# Patient Record
Sex: MALE | Race: ASIAN | NOT HISPANIC OR LATINO | Employment: UNEMPLOYED | URBAN - METROPOLITAN AREA
[De-identification: names, ages, dates, MRNs, and addresses within clinical notes are randomized per-mention and may not be internally consistent; named-entity substitution may affect disease eponyms.]

---

## 2024-02-12 ENCOUNTER — APPOINTMENT (OUTPATIENT)
Dept: GENERAL RADIOLOGY | Facility: CLINIC | Age: 24
End: 2024-02-12
Attending: EMERGENCY MEDICINE
Payer: COMMERCIAL

## 2024-02-12 ENCOUNTER — HOSPITAL ENCOUNTER (EMERGENCY)
Facility: CLINIC | Age: 24
Discharge: HOME | End: 2024-02-12
Attending: EMERGENCY MEDICINE | Admitting: EMERGENCY MEDICINE
Payer: COMMERCIAL

## 2024-02-12 VITALS
SYSTOLIC BLOOD PRESSURE: 130 MMHG | TEMPERATURE: 98.4 F | BODY MASS INDEX: 20.89 KG/M2 | OXYGEN SATURATION: 100 % | DIASTOLIC BLOOD PRESSURE: 76 MMHG | RESPIRATION RATE: 20 BRPM | HEIGHT: 66 IN | HEART RATE: 82 BPM | WEIGHT: 130 LBS

## 2024-02-12 DIAGNOSIS — S66.921A HAND LACERATION INVOLVING TENDON, RIGHT, INITIAL ENCOUNTER: ICD-10-CM

## 2024-02-12 DIAGNOSIS — S61.411A HAND LACERATION INVOLVING TENDON, RIGHT, INITIAL ENCOUNTER: ICD-10-CM

## 2024-02-12 DIAGNOSIS — S62.300B: ICD-10-CM

## 2024-02-12 PROCEDURE — 26600 TREAT METACARPAL FRACTURE: CPT | Mod: 59

## 2024-02-12 PROCEDURE — 96365 THER/PROPH/DIAG IV INF INIT: CPT | Mod: 59

## 2024-02-12 PROCEDURE — 90715 TDAP VACCINE 7 YRS/> IM: CPT | Performed by: EMERGENCY MEDICINE

## 2024-02-12 PROCEDURE — 12002 RPR S/N/AX/GEN/TRNK2.6-7.5CM: CPT

## 2024-02-12 PROCEDURE — 250N000011 HC RX IP 250 OP 636: Performed by: EMERGENCY MEDICINE

## 2024-02-12 PROCEDURE — 99284 EMERGENCY DEPT VISIT MOD MDM: CPT | Mod: 25

## 2024-02-12 PROCEDURE — 73130 X-RAY EXAM OF HAND: CPT | Mod: RT

## 2024-02-12 PROCEDURE — 90471 IMMUNIZATION ADMIN: CPT | Performed by: EMERGENCY MEDICINE

## 2024-02-12 RX ORDER — LIDOCAINE HYDROCHLORIDE AND EPINEPHRINE 10; 10 MG/ML; UG/ML
INJECTION, SOLUTION INFILTRATION; PERINEURAL
Status: DISCONTINUED
Start: 2024-02-12 | End: 2024-02-12 | Stop reason: HOSPADM

## 2024-02-12 RX ORDER — CEPHALEXIN 500 MG/1
500 CAPSULE ORAL 4 TIMES DAILY
Qty: 28 CAPSULE | Refills: 0 | Status: SHIPPED | OUTPATIENT
Start: 2024-02-12 | End: 2024-02-19

## 2024-02-12 RX ORDER — LIDOCAINE HYDROCHLORIDE AND EPINEPHRINE 10; 10 MG/ML; UG/ML
10 INJECTION, SOLUTION INFILTRATION; PERINEURAL ONCE
Status: DISCONTINUED | OUTPATIENT
Start: 2024-02-12 | End: 2024-02-12 | Stop reason: HOSPADM

## 2024-02-12 RX ORDER — CEFAZOLIN SODIUM 1 G/3ML
1 INJECTION, POWDER, FOR SOLUTION INTRAMUSCULAR; INTRAVENOUS ONCE
Status: COMPLETED | OUTPATIENT
Start: 2024-02-12 | End: 2024-02-12

## 2024-02-12 RX ADMIN — CEFAZOLIN 1 G: 1 INJECTION, POWDER, FOR SOLUTION INTRAMUSCULAR; INTRAVENOUS at 14:08

## 2024-02-12 RX ADMIN — CLOSTRIDIUM TETANI TOXOID ANTIGEN (FORMALDEHYDE INACTIVATED), CORYNEBACTERIUM DIPHTHERIAE TOXOID ANTIGEN (FORMALDEHYDE INACTIVATED), BORDETELLA PERTUSSIS TOXOID ANTIGEN (GLUTARALDEHYDE INACTIVATED), BORDETELLA PERTUSSIS FILAMENTOUS HEMAGGLUTININ ANTIGEN (FORMALDEHYDE INACTIVATED), BORDETELLA PERTUSSIS PERTACTIN ANTIGEN, AND BORDETELLA PERTUSSIS FIMBRIAE 2/3 ANTIGEN 0.5 ML: 5; 2; 2.5; 5; 3; 5 INJECTION, SUSPENSION INTRAMUSCULAR at 11:47

## 2024-02-12 ASSESSMENT — ACTIVITIES OF DAILY LIVING (ADL)
ADLS_ACUITY_SCORE: 33
ADLS_ACUITY_SCORE: 35

## 2024-02-12 NOTE — ED NOTES
Writer called to inform [facility] personnel at home care that pt. Is for discharged. [facility] will come and pick him up. Pt. Discharged. AVS given.

## 2024-02-12 NOTE — ED TRIAGE NOTES
Pt was shadowboxing, got too close to the mirror and punched the mirror causing it to break. Pt called 911, hand was wrapped by EMS, did not unwrap in triage.     Pt has a bag with a piece of bone in it. Pt also reports he is unable to outstretch fingers.      Triage Assessment (Adult)       Row Name 02/12/24 1047          Triage Assessment    Airway WDL WDL        Respiratory WDL    Respiratory WDL WDL        Skin Circulation/Temperature WDL    Skin Circulation/Temperature WDL --  per pt laceration on right hand, hand wrapped by EMS        Cardiac WDL    Cardiac WDL WDL        Peripheral/Neurovascular WDL    Peripheral Neurovascular WDL WDL        Cognitive/Neuro/Behavioral WDL    Cognitive/Neuro/Behavioral WDL WDL

## 2024-02-12 NOTE — ED PROVIDER NOTES
"    History     Chief Complaint:  Laceration       HPI   Jw Broderick is a 24 year old male who has a history of chronic schizophrenia and presents with a laceration. The patient was shadowboxing in his bathroom, when he punched upwards against an already broken mirror. He injured his right hand, lacerating his index finger. Patient lives at a group home. He is unable to extend the finger. He brings a small bony fragment from the injury as well. This occurred at around 1020. He is not up to date with Tetanus.    Independent Historian:    None    Review of External Notes:  None    Medications:    Olanzapine    Past Medical History:    Chronic schizophrenia    Physical Exam   Patient Vitals for the past 24 hrs:   BP Temp Temp src Pulse Resp SpO2 Height Weight   02/12/24 1051 130/76 98.4  F (36.9  C) Temporal 82 20 100 % 1.676 m (5' 6\") 59 kg (130 lb)      Physical Exam  Vitals and nursing note reviewed.   Constitutional:       General: He is not in acute distress.     Appearance: He is not ill-appearing.   HENT:      Head: Normocephalic and atraumatic.      Right Ear: External ear normal.      Left Ear: External ear normal.      Nose: Nose normal.      Mouth/Throat:      Mouth: Mucous membranes are moist.   Eyes:      Extraocular Movements: Extraocular movements intact.      Conjunctiva/sclera: Conjunctivae normal.   Pulmonary:      Effort: Pulmonary effort is normal. No respiratory distress.   Musculoskeletal:         General: No deformity or signs of injury.      Right hand: Laceration and tenderness present. Decreased range of motion. Decreased strength. Normal sensation. There is no disruption of two-point discrimination. Normal capillary refill. Normal pulse.        Hands:       Cervical back: Normal range of motion and neck supple.   Skin:     General: Skin is warm and dry.      Findings: No rash.   Neurological:      Mental Status: He is alert and oriented to person, place, and time.   Psychiatric:    "      Behavior: Behavior normal.           Emergency Department Course     Imaging:  XR Hand Right G/E 3 Views   Final Result   IMPRESSION:      Examination is limited due to patient's inability to extend fingers to   position correctly for the exam.  The lateral view in particular is   limited by overlap of osseous structures due to patient's inability to   position.      There appears to be a small osseous fragment projecting over the index   finger metacarpal head and first MTP joint, only seen on the AP view   but suspicious for an acute fracture. Consider CT for better   evaluation.      NOTE: ABNORMAL REPORT      THE DICTATION ABOVE DESCRIBES AN ABNORMAL REPORT FOR WHICH FOLLOW-UP   IS NEEDED.      MARCE OLIVAS MD            SYSTEM ID:  [id number]        Report per radiology    Procedures       Laceration Repair      Procedure: Laceration Repair    Indication: Laceration    Consent: Verbal    Location: Right R third (middle) finger and R second (index) finger    Length: 4.5 cm in the index finger, 1.5 cm in the middle finger.    Preparation: Irrigation with Tap Water and Pressure device utilized.    Anesthesia/Sedation: Lidocaine with Epinephrine - 1%      Treatment/Exploration: Wound explored, no foreign bodies found     Closure: The wound was closed with one layer. Skin/superficial layer was closed with 7 x 5-0 Nylon using Interrupted sutures.  And the middle finger wound was closed with one layer. Skin/superficial layer was closed with 2 x 5-0 Nylon using Interrupted sutures.     Patient Status: The patient tolerated the procedure well: Yes. There were no complications.    Narrative:  Splint Placement   Splint was applied and after placement I checked and adjusted the fit to ensure proper positioning. Patient was more comfortable with splint in place. Sensation and circulation are intact after splint placement.    Emergency Department Course & Assessments:    Interventions:  Medications   lidocaine 1%  with EPINEPHrine 1:100,000 injection 10 mL (has no administration in time range)   lidocaine 1% with EPINEPHrine 1:100,000 1 %-1:[id number] injection (has no administration in time range)   Tdap (tetanus-diphtheria-acell pertussis) (ADACEL) injection 0.5 mL (0.5 mLs Intramuscular $Given 24 1147)   ceFAZolin (ANCEF) 1 g vial to attach to  ml bag for ADULT or 50 ml bag for PEDS (0 g Intravenous Stopped 24 1438)      Assessments:  1120 I obtained history and examined patient.   1320 I rechecked the patient and explained findings. Laceration procedure performed.  1352 Laceration procedure completed.    Independent Interpretation (X-rays, CTs, rhythm strip):  I reviewed the right hand x-ray and do not see any definite fractures per my read    Consultations/Discussion of Management or Tests:  1325 I spoke with REY Castillo of the [facility] Hand Orthoped[facility] service to discuss the patient's findings, presentation, and plan of care.       Social Determinants of Health affecting care:  None     Disposition:  The patient was discharged to home.   Impression & Plan    Medical Decision Makin-year-old male presenting with a right hand injury that occurred earlier today.  He lacerated the dorsal right hand on a broken mirror.  There do not appear to be any foreign bodies in the wound.  I am worried that he may have a extensor tendon injury and joint capsule injury based on the location of the injury and his inability to extend the finger without severe pain.  He also had a bony chip that he took out of the wound which I am worried could be from the head of the metacarpal bone.  X-ray suggests a possible fracture of the metacarpal head as well according to radiology.  I discussed this further with orthopedics and they recommend loose approximation after thorough cleaning, splinting, antibiotics, and hand surgery follow-up with Dr. Rosenthal..  The wound was anesthetized with lidocaine and epinephrine as noted above.  The wound  was thoroughly irrigated and loosely repaired.  He was then placed in a volar Ortho-Glass splint.  He was given a dose of Ancef and his Tdap was updated.  He will be sent home on Keflex and he will follow-up with hand surgery.  We discussed return precautions.    Diagnosis:    ICD-10-CM    1. Hand laceration involving tendon, right, initial encounter  S61.411A     S66.921A       2. Open nondisplaced fracture of second metacarpal bone of right hand, unspecified portion of metacarpal, initial encounter  S62.300B            Discharge Medications:  New Prescriptions    CEPHALEXIN (KEFLEX) 500 MG CAPSULE    Take 1 capsule (500 mg) by mouth 4 times daily for 7 days      Scribe Disclosure:  Brandon SANDERS, am serving as a scribe at 11:12 AM on 2/12/2024 to document services personally performed by Cal Trujillo MD based on my observations and the provider's statements to me.  2/12/2024   Cal Trujillo MD Goodwin, Shaun M, MD  02/12/24 7977

## 2024-03-11 ENCOUNTER — HOSPITAL ENCOUNTER (EMERGENCY)
Facility: CLINIC | Age: 24
Discharge: HOME | End: 2024-03-11
Attending: PSYCHIATRY & NEUROLOGY | Admitting: PSYCHIATRY & NEUROLOGY
Payer: COMMERCIAL

## 2024-03-11 VITALS
HEIGHT: 66 IN | TEMPERATURE: 97.8 F | DIASTOLIC BLOOD PRESSURE: 79 MMHG | BODY MASS INDEX: 21.69 KG/M2 | SYSTOLIC BLOOD PRESSURE: 137 MMHG | RESPIRATION RATE: 16 BRPM | WEIGHT: 135 LBS | HEART RATE: 72 BPM | OXYGEN SATURATION: 98 %

## 2024-03-11 DIAGNOSIS — F31.11 BIPOLAR 1 DISORDER, MANIC, MILD (H): ICD-10-CM

## 2024-03-11 PROBLEM — F39 UNSPECIFIED MOOD (AFFECTIVE) DISORDER (H): Status: ACTIVE | Noted: 2024-03-11

## 2024-03-11 PROCEDURE — 99284 EMERGENCY DEPT VISIT MOD MDM: CPT | Performed by: PSYCHIATRY & NEUROLOGY

## 2024-03-11 PROCEDURE — 99283 EMERGENCY DEPT VISIT LOW MDM: CPT | Performed by: PSYCHIATRY & NEUROLOGY

## 2024-03-11 PROCEDURE — 250N000013 HC RX MED GY IP 250 OP 250 PS 637: Performed by: PSYCHIATRY & NEUROLOGY

## 2024-03-11 RX ORDER — OLANZAPINE 15 MG/1
15 TABLET ORAL AT BEDTIME
Status: ON HOLD | COMMUNITY
End: 2024-08-09

## 2024-03-11 RX ORDER — OLANZAPINE 5 MG/1
5 TABLET ORAL EVERY MORNING
Qty: 30 TABLET | Refills: 0 | Status: ON HOLD | OUTPATIENT
Start: 2024-03-11 | End: 2024-08-09

## 2024-03-11 RX ADMIN — OLANZAPINE 15 MG: 10 TABLET, ORALLY DISINTEGRATING ORAL at 22:36

## 2024-03-11 ASSESSMENT — ACTIVITIES OF DAILY LIVING (ADL)
ADLS_ACUITY_SCORE: 35
ADLS_ACUITY_SCORE: 35
ADLS_ACUITY_SCORE: 33
ADLS_ACUITY_SCORE: 33

## 2024-03-11 ASSESSMENT — COLUMBIA-SUICIDE SEVERITY RATING SCALE - C-SSRS
6. HAVE YOU EVER DONE ANYTHING, STARTED TO DO ANYTHING, OR PREPARED TO DO ANYTHING TO END YOUR LIFE?: NO
2. HAVE YOU ACTUALLY HAD ANY THOUGHTS OF KILLING YOURSELF IN THE PAST MONTH?: NO
1. IN THE PAST MONTH, HAVE YOU WISHED YOU WERE DEAD OR WISHED YOU COULD GO TO SLEEP AND NOT WAKE UP?: NO

## 2024-03-11 NOTE — ED TRIAGE NOTES
Patient reports he is was doing back flips at the  that is why he damaged the walls in his room. Patient reports he accidentally toss his phone that is why it broke the window.      Triage Assessment (Adult)       Row Name 03/11/24 1832          Triage Assessment    Airway WDL WDL        Respiratory WDL    Respiratory WDL WDL        Skin Circulation/Temperature WDL    Skin Circulation/Temperature WDL WDL        Cardiac WDL    Cardiac WDL WDL        Peripheral/Neurovascular WDL    Peripheral Neurovascular WDL WDL

## 2024-03-11 NOTE — ED PROVIDER NOTES
"    [facility] EMERGENCY DEPARTMENT ([facility])    3/11/24      ED PROVIDER NOTE   History     Chief Complaint   Patient presents with    Mental Health Problem     Patient was drop off by  staff, was told he needs a mental health check since patient has damaged the wall and broken a window at the      HPI  Jw Broderick is a 24 year old male with a past medical history of bipolar illness who presents to the ED for evaluation of mental health problem. Patient resides in a group home. He has been hyper and elevated in his mood and had damaged a wall and broken a window today while trying to do backflips in his room. Staff was concerned and had him come in for an assessment. Patient appears mildly elevated but is easily redirectable. He denies any thoughts of harm to self or others. He agrees that doing backflips in his room is not a good idea.     Group home is comfortable with his return. They plan on having him see his established psychiatrist sooner than his appointment at the end of the month.    Past Medical History  History reviewed. No pertinent past medical history.  No past surgical history on file.  OLANZapine (ZYPREXA) 15 MG tablet  OLANZapine (ZYPREXA) 5 MG tablet      No Known Allergies  Family History  No family history on file.  Social History   Social History     Tobacco Use    Smoking status: Never    Smokeless tobacco: Never   Substance Use Topics    Alcohol use: Not Currently    Drug use: Not Currently         A medically appropriate review of systems was performed with pertinent positives and negatives noted in the HPI, and all other systems negative.    Physical Exam   BP: 119/74  Pulse: 80  Temp: 98  F (36.7  C)  Resp: 16  Height: 167.6 cm (5' 6\")  Weight: 61.2 kg (135 lb)  SpO2: 97 %  Physical Exam  Vitals and nursing note reviewed.   HENT:      Head: Normocephalic.   Eyes:      Pupils: Pupils are equal, round, and reactive to light.   Pulmonary:      Effort: Pulmonary effort " "is normal.   Musculoskeletal:         General: Normal range of motion.      Cervical back: Normal range of motion.   Neurological:      General: No focal deficit present.      Mental Status: He is alert.   Psychiatric:         Attention and Perception: Attention and perception normal. He does not perceive auditory or visual hallucinations.         Mood and Affect: Affect normal. Mood is elated.         Speech: Speech is rapid and pressured.         Behavior: Behavior normal. Behavior is not agitated, aggressive, hyperactive or combative. Behavior is cooperative.         Thought Content: Thought content normal. Thought content is not paranoid or delusional. Thought content does not include homicidal or suicidal ideation.         Cognition and Memory: Cognition and memory normal.         Judgment: Judgment normal.           ED Course, Procedures, & Data      Procedures       A consult was attained from the  DEC service. The case was discussed with the  from that service. The consulting service's recommendations were provided at 10:30 PM. 10 minutes spent reviewing prior records and interventions. 10 minutes spent discussing case, care and disposition.     Patient has history of being seen previously as he had lacerated his hand while \"shadowboxing\" and he had injured his hand on broken glass. He denies any damage to his body presently.     No results found for any visits on 03/11/24.  Medications   OLANZapine zydis (zyPREXA) ODT tab 15 mg (15 mg Oral $Given 3/11/24 9117)     Labs Ordered and Resulted from Time of ED Arrival to Time of ED Departure - No data to display  No orders to display          Critical care was not performed.     Medical Decision Making  The patient's presentation was of moderate complexity (a chronic illness mild to moderate exacerbation, progression, or side effect of treatment).    The patient's evaluation involved:  an assessment requiring an independent historian (see separate area " of note for details)  review of external note(s) from 2 sources (see separate area of note for details)  review of 2 test result(s) ordered prior to this encounter (see separate area of note for details)    The patient's management necessitated moderate risk (prescription drug management including medications given in the ED) and moderate risk (limitations due to social determinants of health (inadequate community  support)).    Assessment & Plan    Patient is here as he appears mildly decompensated and presently with mild yojana. He is redirectable. There is no acute safety concern requiring holding him here against his will nor admitting him. He agrees to taking olanzapine 5 mg in the morning in addition to his HS olanzapine to stabilize his yojana. Patient can be discharged. He is encouraged to follow-up with his established care services and providers for ongoing care and support.    I have reviewed the nursing notes. I have reviewed the findings, diagnosis, plan and need for follow up with the patient.    Discharge Medication List as of 3/11/2024 10:32 PM        START taking these medications    Details   !! OLANZapine (ZYPREXA) 5 MG tablet Take 1 tablet (5 mg) by mouth every morning, Disp-30 tablet, R-0, Local Print       !! - Potential duplicate medications found. Please discuss with provider.          Final diagnoses:   Bipolar 1 disorder, manic, mild (H)       M [facility] EMERGENCY DEPARTMENT  3/11/2024     Rupert Guajardo MD  03/11/24 0118

## 2024-03-12 NOTE — ED NOTES
Writer spoke to [facility] patient's . Staff is aware patient will be returning tonight and per  patient is safe to discharge via cab.

## 2024-03-12 NOTE — CONSULTS
Diagnostic Evaluation Consultation  Crisis Assessment    Patient Name: Jw Broderick  Age:  24 year old  Legal Sex: male  Gender Identity: male  Pronouns:   Race:   Ethnicity: Not  or   Language: English      Patient was assessed: Virtual: iPad Crisis Assessment Start Time: 2116 Crisis Assessment Stop Time: 2155  Patient location: [facility] EMERGENCY DEPARTMENT                             URE-H    Referral Data and Chief Complaint  Jw Broderick presents to the ED via EMS. Patient is presenting to the ED for the following concerns: Physical aggression.   Factors that make the mental health crisis life threatening or complex are:  Patient reports he is was doing back flips at the  that is why he damaged the walls in his room. Patient reports he accidentally toss his phone that is why it broke the window..      Informed Consent and Assessment Methods  Explained the crisis assessment process, including applicable information disclosures and limits to confidentiality, assessed understanding of the process, and obtained consent to proceed with the assessment.  Assessment methods included conducting a formal interview with patient, review of medical records, collaboration with medical staff, and obtaining relevant collateral information from family and community providers when available.  : done     Patient response to interventions: eager to participate, acceptance expressed  Coping skills were attempted to reduce the crisis:  going to the gym     History of the Crisis   He reports tonight he was doing flips in his room (lives at group home and reports this is due to mental health, though denies he has any specific mental health issues) and that he often tries to stay active and be athletic.  This led to him falling into the walls, making a hole in the wall. He also tossed his phone, intending for it land on the window ledge and instead it hit the window and broke the  window.  Also notes this morning opening a door that he feels was  really light, lighter than I thought  and the hinges came off when he opened it.  Reports the group home staff  didn t understand  and that this is the third or forth time he has caused this kind of damage.  He reports  they wanted a check-up  and sent him to the ED. He notes that he isn t sure they believe his explanation and he wonders if they think he was being aggressive.  Reports his mental health is doing  really good . Reports he was in long-term and released the next day and  forgot to call in  which led to him being admitted to a mental hospital (this occurred 4 years ago), after which he went to a group home. Reports he does not feel he has schizophrenia though is aware he has been diagnosed with that in the past. Does take Olanzapine and notes that is does help him clear his thoughts and keep him calm.    Enjoys doing  everything and anything athletic . Used to play on sports team, now goes to the gym (started two weeks ago). Has been looking for a new job. Car recently broke down and he plans to fix this soon. Group home staff bring him to doctor's appointments. Gets along well with group home staff. Denies any aggressive behaviors toward himself or others. Denies suicidal ideations. Has been experiencing more goal directed activities (cleaning room, wanting to work out, renewed focus on job hunting). Substance use wise, uses nicotine (vapes lasts 1 week), caffeine (drinks 2 cans of pop a day and 1-2 cups of tea, 1-2 energy drinks a day; this is a reduction from his usual baseline of 1-3 cans of pop per day,  jug  of coffee a day, 1-2 energy drinks a day). Notes that he started cutting back last year after staying up 4-5 days straight and feeling this was due to his caffeine intake at the time. Denies any other substance use now or in the past. Go to bed sometime between 8pm and midnight, takes about 20-30 minutes to fall asleep, sleeps  through the night, and wakes between 5-8am. Reports he is getting about 8 hours per night. Medications prescribed by Dr. Sorto with [facility]. January was last appointment; next appointment March 28th.Denies hallucination, delusions, or paranoia. Reports no mood or anxiety concerns. Would like to discharge back to the group home and agrees it may be good to check in with his psychiatrist about the increased energy and see if a medication change may assist, although patient feels his energy levels are normal and struggles with insight into how his behaviors may be somewhat different than would be expected.    Brief Psychosocial History  Family:  Single, Children no  Support System:  Facility resident(s)/Staff  Employment Status:  unemployed  Source of Income:  disability  Financial Environmental Concerns:  none  Current Hobbies:  exercise/fitness, television/movies/videos, social media/computer activities  Barriers in Personal Life:  mental health concerns    Significant Clinical History  Current Anxiety Symptoms:     Current Depression/Trauma:     Current Somatic Symptoms:     Current Psychosis/Thought Disturbance:  impulsive, hyperverbal  Current Eating Symptoms:     Chemical Use History:      Past diagnosis:  Schizophrenia  Family history:  No known history of mental health or chemical health concerns  Past treatment:  Inpatient Hospitalization, Primary Care, Psychiatric Medication Management  Details of most recent treatment:  Sees psychiatrist Q3 months  Other relevant history:          Collateral Information (gathered by Nicci Taylor)  Is there collateral information: Yes     Collateral information name, relationship, phone number:  Remberto Smith () [phone] and group home nurse, Shon    What happened today: The pt had been a resident of this group home for 2 years, then in May 2023 the pt returned to his families home and was staying there. After the pt had lived at home and saved  "money he left his family home and was homeless for approximately one month before he ended up moving back into the group home. The pt moved back to the  on September 9th, 2023. Since the pt moved back in Sept. he has been more hyper, agitated, and increasingly destructive. Pt has caused damage to the property a number of times, including today when he caused holes in the wall (they believe from punching the wall) and also broke a window. The  has 1 staff for 3 residents, the pt is allowed to come and go. Today the pt left at about 9 am and was home by 11 am, he left again for a couple of hours in the afternoon. Staff state the pt is medication compliant, they are not aware of drug or alcohol use, but the pt is able to freely leave the facility so they are not certain. The pt is not currently working, he has not made any suicidal comments or reported SI to staff, pt is his own guardian. The pt told his nurse, Shon today that he \"doesn't know what is wrong with me.\"     What is different about patient's functioning: Pt has been distinctly more hyper, agitated, and destructive with property since his return in Sept 2023. The group home states that the pt can return there, although they are very concerned with the drastic behavioral changes the pt has been displaying.        Risk Assessment  Matheson Suicide Severity Rating Scale Full Clinical Version:  Suicidal Ideation  Q6 Suicide Behavior (Lifetime): no          Matheson Suicide Severity Rating Scale Recent:   Suicidal Ideation (Recent)  Q1 Wished to be Dead (Past Month): no  Q2 Suicidal Thoughts (Past Month): no  Level of Risk per Screen: no risks indicated     Suicidal Behavior (Recent)  Actual Attempt (Past 3 Months): No  Has subject engaged in non-suicidal self-injurious behavior? (Past 3 Months): No  Interrupted Attempts (Past 3 Months): No  Aborted or Self-Interrupted Attempt (Past 3 Months): No  Preparatory Acts or Behavior (Past 3 Months): " No    Environmental or Psychosocial Events: impulsivity/recklessness, neither working nor attending school  Protective Factors: Protective Factors: lives in a responsibly safe and stable environment, good treatment engagement, sense of importance of health and wellness, able to access care without barriers, supportive ongoing medical and mental health care relationships, sense of self-efficacy and/or positive self-esteem, optimistic outlook - identification of future goals    Does the patient have thoughts of harming others? Feels Like Hurting Others: no  Previous Attempt to Hurt Others: no  Is the patient engaging in sexually inappropriate behavior?: no    Is the patient engaging in sexually inappropriate behavior?  no        Mental Status Exam   Affect: Appropriate  Appearance: Appropriate  Attention Span/Concentration: Attentive  Eye Contact: Intense    Fund of Knowledge: Appropriate   Language /Speech Content: Fluent, Expressive Speech  Language /Speech Volume: Normal  Language /Speech Rate/Productions: Hyperverbal  Recent Memory: Intact  Remote Memory: Intact  Mood: Normal  Orientation to Person: Yes   Orientation to Place: Yes  Orientation to Time of Day: Yes  Orientation to Date: Yes     Situation (Do they understand why they are here?): Yes  Psychomotor Behavior: Normal  Thought Content: Clear  Thought Form: Intact, Loose Associations        Medication  Psychotropic medications:   Medication Orders - Psychiatric (From admission, onward)      Start     Dose/Rate Route Frequency Ordered Stop    03/11/24 2230  OLANZapine zydis (zyPREXA) ODT tab 15 mg        Note to Pharmacy: DO NOT USE THIS FIELD FOR ADMIN INSTRUCTIONS; INFORMATION DOES NOT SHOW ON MAR. USE THE FIELD ABOVE MARKED ADMIN INSTRUCTIONS    15 mg Oral ONCE 03/11/24 2225      03/11/24 0000  OLANZapine (ZYPREXA) 5 MG tablet         5 mg Oral EVERY MORNING 03/11/24 2218               Current Care Team  Patient Care Team:  No Ref-Primary, Physician as  PCP - General  Dr. Sorto as PCP    Diagnosis  Patient Active Problem List   Diagnosis Code    Unspecified mood (affective) disorder (H24) F39       Primary Problem This Admission  Active Hospital Problems    *Unspecified mood (affective) disorder (H24)        Clinical Summary and Substantiation of Recommendations   Patient with a history of schiophrenia appears to have been experiencing an increase in engery, goal directed activity, and impulsive/reckless behaviors for the past few months leading to unplanned property damage and concerns from group home staff that he is unreasonably hyper and appears agitated. Patient has limited insight into these concerns, though does note that moving forward he will plan to engage in physically strenous activities at the gym or outdoors. He is open to meeting with his established psychiatrist to discuss the increased energy. Does not meet criteria for a manic episode at this time, though certainly worth a follow up with his psychiatrist to discuss. Pt lives in a safe and responsible setting, denies any thoughts of harming self or others, appears to able to care for self with the support of his group home staff. Safe at this time to discharge to outpatient setting. Already has scheduled follow up with established psychiatrist (3/28/24)  and after conversation with group home staff, they will plan to contact patients psychiatrist to see if he can be seen sooner.       Patient coping skills attempted to reduce the crisis:  going to the gym    Disposition  Recommended disposition: Medication Management        Reviewed case and recommendations with attending provider. Attending Name: Dr. Prakash       Attending concurs with disposition: yes       Patient and/or validated legal guardian concurs with disposition:   yes       Final disposition:  discharge    Assessment Details   Total duration spent with the patient: 39 min     CPT code(s) utilized: 36083 - Psychotherapy for Crisis - 60  (25-74*) sridhar [facility], Psychotherapist  DEC - Triage & Transition Services  Callback: [phone]

## 2024-03-12 NOTE — CARE PLAN
03/11/24 2238   Care Path Handoff   Clinical Substantiation Patient with a history of schiophrenia appears to have been experiencing an increase in engery, goal directed activity, and impulsive/reckless behaviors for the past few months leading to unplanned property damage and concerns from group home staff that he is unreasonably hyper and appears agitated. Patient has limited insight into these concerns, though does note that moving forward he will plan to engage in physically strenous activities at the gym or outdoors. He is open to meeting with his established psychiatrist to discuss the increased energy. Does not meet criteria for a manic episode at this time, though certainly worth a follow up with his psychiatrist to discuss. Pt lives in a safe and responsible setting, denies any thoughts of harming self or others, appears to able to care for self with the support of his group home staff. Safe at this time to discharge to outpatient setting. Already has scheduled follow up with established psychiatrist (3/28/24)  and after conversation with group home staff, they will plan to contact patients psychiatrist to see if he can be seen sooner.   Identified Goals and  Safety Issues ED provider plans to prescribe additional psychotropic medication to assist with regulating behavior. Group home staff has noted they may need help arranging transport home for patient.   Designated Contact #1 Santos Smith   [phone]   Plan for Care reviewed with assigned Medical Provider yes   Plan for Care Team Review provider;RN

## 2024-03-12 NOTE — DISCHARGE INSTRUCTIONS
Take additional Zyprexa 5 mg in the morning to stabilize mood.   Please follow-up with established psychiatric provider for ongoing med monitoring and management

## 2024-03-25 ENCOUNTER — HOSPITAL ENCOUNTER (EMERGENCY)
Facility: CLINIC | Age: 24
Discharge: HOME | End: 2024-03-26
Attending: EMERGENCY MEDICINE | Admitting: EMERGENCY MEDICINE
Payer: COMMERCIAL

## 2024-03-25 DIAGNOSIS — F41.9 ANXIETY: ICD-10-CM

## 2024-03-25 PROCEDURE — 99283 EMERGENCY DEPT VISIT LOW MDM: CPT | Performed by: EMERGENCY MEDICINE

## 2024-03-25 PROCEDURE — 99282 EMERGENCY DEPT VISIT SF MDM: CPT | Performed by: EMERGENCY MEDICINE

## 2024-03-25 ASSESSMENT — COLUMBIA-SUICIDE SEVERITY RATING SCALE - C-SSRS
2. HAVE YOU ACTUALLY HAD ANY THOUGHTS OF KILLING YOURSELF IN THE PAST MONTH?: NO
6. HAVE YOU EVER DONE ANYTHING, STARTED TO DO ANYTHING, OR PREPARED TO DO ANYTHING TO END YOUR LIFE?: NO
1. IN THE PAST MONTH, HAVE YOU WISHED YOU WERE DEAD OR WISHED YOU COULD GO TO SLEEP AND NOT WAKE UP?: NO

## 2024-03-26 VITALS
OXYGEN SATURATION: 99 % | HEART RATE: 55 BPM | DIASTOLIC BLOOD PRESSURE: 74 MMHG | WEIGHT: 136.2 LBS | BODY MASS INDEX: 21.98 KG/M2 | TEMPERATURE: 98.1 F | SYSTOLIC BLOOD PRESSURE: 131 MMHG | RESPIRATION RATE: 16 BRPM

## 2024-03-26 ASSESSMENT — ACTIVITIES OF DAILY LIVING (ADL)
ADLS_ACUITY_SCORE: 35
ADLS_ACUITY_SCORE: 33
ADLS_ACUITY_SCORE: 35

## 2024-03-26 NOTE — ED PROVIDER NOTES
[facility] [place]    3/25/24      ED PROVIDER NOTE   History     Chief Complaint   Patient presents with    Fussy     The history is provided by the patient and medical records.     Jw Broderick is a 24 year old male with a past medical history of  bipolar illness who presents to the ED for evaluation of fussiness. Patient reports he has had difficulty sleeping due to a verbal altercation he had with with a stranger earlier. Patient felt more comfortable staying here for the night. Patient feels good currently.     Past Medical History  Past Medical History:   Diagnosis Date    Schizophrenia (H)      History reviewed. No pertinent surgical history.  OLANZapine (ZYPREXA) 15 MG tablet  OLANZapine (ZYPREXA) 5 MG tablet      No Known Allergies  Family History  History reviewed. No pertinent family history.  Social History   Social History     Tobacco Use    Smoking status: Never    Smokeless tobacco: Never   Substance Use Topics    Alcohol use: Not Currently    Drug use: Not Currently         A medically appropriate review of systems was performed with pertinent positives and negatives noted in the HPI, and all other systems negative.    Physical Exam   BP: 136/80  Pulse: 64  Temp: 98.9  F (37.2  C)  Resp: 16  Weight: 61.8 kg (136 lb 3.2 oz)  SpO2: 99 %  Physical Exam  Vitals and nursing note reviewed.   Constitutional:       General: He is not in acute distress.     Appearance: He is well-developed.   HENT:      Head: Normocephalic.      Right Ear: External ear normal.      Left Ear: External ear normal.      Nose: Nose normal.   Eyes:      Extraocular Movements: Extraocular movements intact.      Conjunctiva/sclera: Conjunctivae normal.   Pulmonary:      Effort: Pulmonary effort is normal. No respiratory distress.   Abdominal:      General: Abdomen is flat. There is no distension.   Musculoskeletal:         General: No deformity. Normal range of motion.      Cervical back:  Normal range of motion and neck supple.   Skin:     General: Skin is warm and dry.   Neurological:      Mental Status: He is alert. Mental status is at baseline.      Comments: Oriented   Psychiatric:         Mood and Affect: Mood normal.         Behavior: Behavior normal.           ED Course, Procedures, & Data      Procedures          No results found for any visits on 03/25/24.  Medications - No data to display  Labs Ordered and Resulted from Time of ED Arrival to Time of ED Departure - No data to display  No orders to display        Medical Decision Making  The patient's presentation is strongly suggestive of moderate complexity (a chronic illness mild to moderate exacerbation, progression, or side effect of treatment).    The patient's evaluation involved:  review of external note(s) from 3+ sources (Most recent H&P in addition to clinic and ED note)  review of 2 test result(s) ordered prior to this encounter (Most recent BMP and CBC)    The patient's management involved only low risk treatment.    Assessment & Plan    24-year-old male presents to us with a chief complaint of anxiety.  He was anxious over a verbal altercation he underwent earlier today.  This point he is doing somewhat better.  He request to stay here overnight.  No homicidal or suicidal ideation.    I have reviewed the nursing notes. I have reviewed the findings, diagnosis, plan and need for follow up with the patient.    New Prescriptions    No medications on file       Final diagnoses:   Anxiety   Shruthi SANDERS, am serving as a trained medical scribe to document services personally performed by Julius Otoole DO, based on the provider's statements to me.     Julisu SANDERS DO, was physically present and have reviewed and verified the accuracy of this note documented by Shruthi Vallecillo.     Julius Otoole DO  [facility] EMERGENCY DEPARTMENT  3/25/2024     Julius Otoole DO  03/26/24 0610

## 2024-03-26 NOTE — ED TRIAGE NOTES
Pt reports he could not go to sleep because he got irritated thinking about a verbal altercation he had with a stranger earlier.     Triage Assessment (Adult)       Row Name 03/25/24 2916          Triage Assessment    Airway WDL WDL        Respiratory WDL    Respiratory WDL WDL        Skin Circulation/Temperature WDL    Skin Circulation/Temperature WDL WDL        Cardiac WDL    Cardiac WDL WDL        Peripheral/Neurovascular WDL    Peripheral Neurovascular WDL WDL        Cognitive/Neuro/Behavioral WDL    Cognitive/Neuro/Behavioral WDL WDL

## 2024-07-25 ENCOUNTER — TELEPHONE (OUTPATIENT)
Dept: BEHAVIORAL HEALTH | Facility: CLINIC | Age: 24
End: 2024-07-25
Payer: COMMERCIAL

## 2024-07-25 ENCOUNTER — HOSPITAL ENCOUNTER (EMERGENCY)
Facility: CLINIC | Age: 24
Discharge: PSYCHIATRIC OR SUBSTANCE USE FACILITY | End: 2024-07-29
Attending: EMERGENCY MEDICINE | Admitting: EMERGENCY MEDICINE
Payer: COMMERCIAL

## 2024-07-25 DIAGNOSIS — F23 ACUTE PSYCHOSIS (H): Primary | ICD-10-CM

## 2024-07-25 DIAGNOSIS — F91.9 DESTRUCTIVE BEHAVIOR: ICD-10-CM

## 2024-07-25 PROCEDURE — 250N000013 HC RX MED GY IP 250 OP 250 PS 637: Performed by: EMERGENCY MEDICINE

## 2024-07-25 PROCEDURE — 99285 EMERGENCY DEPT VISIT HI MDM: CPT

## 2024-07-25 RX ORDER — OLANZAPINE 10 MG/1
10 TABLET, ORALLY DISINTEGRATING ORAL
Status: COMPLETED | OUTPATIENT
Start: 2024-07-25 | End: 2024-07-25

## 2024-07-25 RX ORDER — PRAZOSIN HYDROCHLORIDE 1 MG/1
1 CAPSULE ORAL AT BEDTIME
Status: ON HOLD | COMMUNITY
End: 2024-08-09

## 2024-07-25 RX ORDER — OLANZAPINE 5 MG/1
5 TABLET ORAL 2 TIMES DAILY PRN
Status: DISCONTINUED | OUTPATIENT
Start: 2024-07-25 | End: 2024-07-26

## 2024-07-25 RX ORDER — ESCITALOPRAM OXALATE 10 MG/1
10 TABLET ORAL DAILY
Status: ON HOLD | COMMUNITY
End: 2024-08-09

## 2024-07-25 RX ADMIN — OLANZAPINE 10 MG: 10 TABLET, ORALLY DISINTEGRATING ORAL at 17:29

## 2024-07-25 ASSESSMENT — ACTIVITIES OF DAILY LIVING (ADL)
ADLS_ACUITY_SCORE: 35

## 2024-07-25 ASSESSMENT — COLUMBIA-SUICIDE SEVERITY RATING SCALE - C-SSRS
6. HAVE YOU EVER DONE ANYTHING, STARTED TO DO ANYTHING, OR PREPARED TO DO ANYTHING TO END YOUR LIFE?: NO
1. IN THE PAST MONTH, HAVE YOU WISHED YOU WERE DEAD OR WISHED YOU COULD GO TO SLEEP AND NOT WAKE UP?: NO
2. HAVE YOU ACTUALLY HAD ANY THOUGHTS OF KILLING YOURSELF IN THE PAST MONTH?: NO

## 2024-07-25 NOTE — ED NOTES
Pt stating that he wants to leave right now and to not be here. Providers notified. Pt agreeing to wait to hear from doctor.

## 2024-07-25 NOTE — ED PROVIDER NOTES
Emergency Department Note      History of Present Illness     Chief Complaint   Agitation      HPI   Jw Broderick is a 24 year old male with history of schizophrenia and bipolar disorder who presents to ED from his group home for evaluation of agitation.  According to patient, he was doing acrobatic back flips and cart wheels and was bumping into the wall and furniture in his room.  This was causing loud sounds and the staff at his group home were concerned.  He denies feeling agitated, depressed, or anxious.  He denies SI/HI ideations.  He denies intentional disruptive behavior.     According to group home staff who is at bedside, patient has been more agitated lately.  He he is engaging in destructive behaviors and has been destroying the furniture and walls of his room.  He has also been more angry and depressed.  He has also been intentionally fasting and has overall declining health due to severe depression.     Independent Historian   Patient and staff member from group home report history above.    Review of External Notes   Reviewed ED note from 3/25/2024.  Patient was seen for fussiness and verbal altercation.    Reviewed ED note from 3/11/2024.  Seen for manic episode with bipolar illness.    Past Medical History     Medical History and Problem List   Past Medical History:   Diagnosis Date     Schizophrenia (H)        Medications   OLANZapine (ZYPREXA) 15 MG tablet  OLANZapine (ZYPREXA) 5 MG tablet        Surgical History   No past surgical history on file.    Physical Exam     Patient Vitals for the past 24 hrs:   BP Temp Temp src Pulse Resp SpO2   07/25/24 1615 (!) 152/84 99.4  F (37.4  C) Temporal 74 18 99 %     Physical Exam  Physical Exam:  General: sitting comfortably on hospital bed  Head: normocephalic, atraumatic  Eyes: PERRLA, EOMI  Ears: External ears appear normal.   Nose: no signs of bleeding   Throat: moist mucous membranes  Neck: No JVD  CV: regular rate and rhythm  Pulm: lungs  clear to ausculation bilaterally, normal respiratory effort, normal chest expansion with breathing   Abdomen: soft, non-tender, non-distended  MSK: No midline tenderness  Ext: normal range of motion of all extremities. No gross deformities  Skin: warm, dry, no rashes  Neuro: alert and oriented      Psych:  Appearance: awake, alert, not agitated  Attitude: calm, cooperative  Speech: coherent  Thought process: somewhat logical  Associations: no loose associations   Judgement: fair        Diagnostics     Lab Results   Labs Ordered and Resulted from Time of ED Arrival to Time of ED Departure - No data to display    Imaging   No orders to display       EKG   None    Independent Interpretation   None    ED Course      Medications Administered   Medications   OLANZapine (zyPREXA) tablet 5 mg (has no administration in time range)   OLANZapine zydis (zyPREXA) ODT tab 10 mg (10 mg Oral $Given 7/25/24 1727)       Procedures   Procedures     Discussion of Management   None    ED Course   ED Course as of 07/25/24 2155   Thu Jul 25, 2024   1618 I evaluated patient and obtained history.   1725 Discussed with group home . They state that patient is a harm to self and others. Patient is on a hold pending dec assessment.    2117 DEC - recommends inpatient psych. Plan for 72 hour hold. Will wait for psych placement.        Optional/Additional Documentation  None    Medical Decision Making / Diagnosis     CMS Diagnoses: None    MIPS       None    MDM   Jw Broderick is a 24 year old male who presents to the ED from his group home for evaluation of agitation.  According to the group home staff, patient has been more agitated and depressed lately.  He has been engaging in self-destructive behaviors by destroying the walls of his room and the appliances at his group home.  He has also been engaging in verbal altercations with group home staff.  He also has poor health and has been intentionally fasting due to  severe depression.  According to patient, he does not believe he is agitated and states that he has been doing back flips which cause accidental destruction to property.  On exam, patient is calm and coherent.  He does not seem acutely agitated or aggressive.  He denies SI/HI ideations.  He denies any attempts to harm himself or others.  I spoke with the group home administrator, and she states that patient is in danger to himself and others and he has been engaging in severe destructive behaviors.  She does not think patient can return to the group home due to increasing destructive behaviors and agitation.  Patient was put on a temporary hold awaiting DEC assessment.  DEC saw patient and talked to the group [place].  DEC  obtained further information from [facility].  Patient has worsening psychosis, increased hallucinations, and severe destructive behaviors.  [facility] is also concerned that patient has not been taking his antipsychotic medications and that he has been discarding them.  DEC recommends inpatient psych placement due to increased psychosis.  Will plan for 72-hour hold and boarding in the ED pending psych placement.     Disposition   The patient will board in the emergency department pending bed placement. Care was signed out to Dr. Ramirez.     Diagnosis     ICD-10-CM    1. Acute psychosis (H)  F23       2. Destructive behavior  F91.9            Discharge Medications   New Prescriptions    No medications on file         GIRMA Bhakta Kausar, PA-C  07/25/24 2800

## 2024-07-25 NOTE — ED TRIAGE NOTES
"Pt here d/t breaking things at his GH.  Pt states that he has broke stuff \"you know when he is doing back flips and kicks.\"        "
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
## 2024-07-26 ENCOUNTER — TELEPHONE (OUTPATIENT)
Dept: BEHAVIORAL HEALTH | Facility: CLINIC | Age: 24
End: 2024-07-26
Payer: COMMERCIAL

## 2024-07-26 LAB
ALBUMIN SERPL BCG-MCNC: 4.5 G/DL (ref 3.5–5.2)
ALP SERPL-CCNC: 86 U/L (ref 40–150)
ALT SERPL W P-5'-P-CCNC: 29 U/L (ref 0–70)
ANION GAP SERPL CALCULATED.3IONS-SCNC: 13 MMOL/L (ref 7–15)
AST SERPL W P-5'-P-CCNC: 22 U/L (ref 0–45)
BASOPHILS # BLD AUTO: 0.1 10E3/UL (ref 0–0.2)
BASOPHILS NFR BLD AUTO: 1 %
BILIRUB SERPL-MCNC: 1.8 MG/DL
BUN SERPL-MCNC: 12.7 MG/DL (ref 6–20)
CALCIUM SERPL-MCNC: 9.3 MG/DL (ref 8.8–10.4)
CHLORIDE SERPL-SCNC: 103 MMOL/L (ref 98–107)
CREAT SERPL-MCNC: 0.93 MG/DL (ref 0.67–1.17)
EGFRCR SERPLBLD CKD-EPI 2021: >90 ML/MIN/1.73M2
EOSINOPHIL # BLD AUTO: 0.2 10E3/UL (ref 0–0.7)
EOSINOPHIL NFR BLD AUTO: 2 %
ERYTHROCYTE [DISTWIDTH] IN BLOOD BY AUTOMATED COUNT: 14.2 % (ref 10–15)
FLUAV RNA SPEC QL NAA+PROBE: NEGATIVE
FLUBV RNA RESP QL NAA+PROBE: NEGATIVE
GLUCOSE SERPL-MCNC: 138 MG/DL (ref 70–99)
HCO3 SERPL-SCNC: 23 MMOL/L (ref 22–29)
HCT VFR BLD AUTO: 52.6 % (ref 40–53)
HGB BLD-MCNC: 16.6 G/DL (ref 13.3–17.7)
IMM GRANULOCYTES # BLD: 0 10E3/UL
IMM GRANULOCYTES NFR BLD: 0 %
LYMPHOCYTES # BLD AUTO: 2.7 10E3/UL (ref 0.8–5.3)
LYMPHOCYTES NFR BLD AUTO: 27 %
MCH RBC QN AUTO: 26.3 PG (ref 26.5–33)
MCHC RBC AUTO-ENTMCNC: 31.6 G/DL (ref 31.5–36.5)
MCV RBC AUTO: 83 FL (ref 78–100)
MONOCYTES # BLD AUTO: 0.8 10E3/UL (ref 0–1.3)
MONOCYTES NFR BLD AUTO: 8 %
NEUTROPHILS # BLD AUTO: 6.1 10E3/UL (ref 1.6–8.3)
NEUTROPHILS NFR BLD AUTO: 62 %
NRBC # BLD AUTO: 0 10E3/UL
NRBC BLD AUTO-RTO: 0 /100
PLATELET # BLD AUTO: 381 10E3/UL (ref 150–450)
POTASSIUM SERPL-SCNC: 4.3 MMOL/L (ref 3.4–5.3)
PROT SERPL-MCNC: 7.2 G/DL (ref 6.4–8.3)
RBC # BLD AUTO: 6.32 10E6/UL (ref 4.4–5.9)
RSV RNA SPEC NAA+PROBE: NEGATIVE
SARS-COV-2 RNA RESP QL NAA+PROBE: NEGATIVE
SODIUM SERPL-SCNC: 139 MMOL/L (ref 135–145)
WBC # BLD AUTO: 9.9 10E3/UL (ref 4–11)

## 2024-07-26 PROCEDURE — 85025 COMPLETE CBC W/AUTO DIFF WBC: CPT | Performed by: BEHAVIOR TECHNICIAN

## 2024-07-26 PROCEDURE — 250N000013 HC RX MED GY IP 250 OP 250 PS 637: Performed by: STUDENT IN AN ORGANIZED HEALTH CARE EDUCATION/TRAINING PROGRAM

## 2024-07-26 PROCEDURE — 250N000013 HC RX MED GY IP 250 OP 250 PS 637: Performed by: EMERGENCY MEDICINE

## 2024-07-26 PROCEDURE — 84443 ASSAY THYROID STIM HORMONE: CPT | Performed by: STUDENT IN AN ORGANIZED HEALTH CARE EDUCATION/TRAINING PROGRAM

## 2024-07-26 PROCEDURE — 87637 SARSCOV2&INF A&B&RSV AMP PRB: CPT | Performed by: BEHAVIOR TECHNICIAN

## 2024-07-26 PROCEDURE — 36415 COLL VENOUS BLD VENIPUNCTURE: CPT | Performed by: BEHAVIOR TECHNICIAN

## 2024-07-26 PROCEDURE — 80053 COMPREHEN METABOLIC PANEL: CPT | Performed by: BEHAVIOR TECHNICIAN

## 2024-07-26 RX ORDER — OLANZAPINE 5 MG/1
5 TABLET ORAL EVERY MORNING
Status: DISCONTINUED | OUTPATIENT
Start: 2024-07-27 | End: 2024-07-29 | Stop reason: HOSPADM

## 2024-07-26 RX ORDER — ESCITALOPRAM OXALATE 10 MG/1
10 TABLET ORAL DAILY
Status: DISCONTINUED | OUTPATIENT
Start: 2024-07-26 | End: 2024-07-29 | Stop reason: HOSPADM

## 2024-07-26 RX ORDER — PRAZOSIN HYDROCHLORIDE 1 MG/1
1 CAPSULE ORAL AT BEDTIME
Status: DISCONTINUED | OUTPATIENT
Start: 2024-07-26 | End: 2024-07-29 | Stop reason: HOSPADM

## 2024-07-26 RX ORDER — OLANZAPINE 5 MG/1
15 TABLET ORAL AT BEDTIME
Status: DISCONTINUED | OUTPATIENT
Start: 2024-07-26 | End: 2024-07-29 | Stop reason: HOSPADM

## 2024-07-26 RX ADMIN — PRAZOSIN HYDROCHLORIDE 1 MG: 1 CAPSULE ORAL at 21:45

## 2024-07-26 RX ADMIN — OLANZAPINE 5 MG: 5 TABLET, FILM COATED ORAL at 08:11

## 2024-07-26 RX ADMIN — NICOTINE POLACRILEX 2 MG: 2 GUM, CHEWING ORAL at 13:03

## 2024-07-26 RX ADMIN — ESCITALOPRAM OXALATE 10 MG: 10 TABLET, FILM COATED ORAL at 19:52

## 2024-07-26 RX ADMIN — OLANZAPINE 15 MG: 5 TABLET, FILM COATED ORAL at 21:44

## 2024-07-26 NOTE — ED NOTES
Patient up to window stating he was only here for a blood draw and he wants to make a phone call to person who he talked to earlier who also couldn't tell him why he was here. Writer entered commons area and reoriented patient to plan for admission. Patient upset and ruminating on blood draw. Patient became up and went back into room, picked up and dropped lunch tray onto floor and got into bed.
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
## 2024-07-26 NOTE — ED NOTES
Patient walking around room, intermittently yelling out. Patient transferred to [facility].

## 2024-07-26 NOTE — TELEPHONE ENCOUNTER
R: [place]  Access Inpatient Bed Call Log 7/25/24 @ 3:55 PM    Intake has called facilities that have not updated the bed status within the last 12 hours.                               [facility] is at capacity            [facility] is posting 0 beds. [phone];  [facility] is posting 0 beds. Negative covid required  [facility] is posting 0 beds. Neg covid. No high school/[place]-psych. [phone].  [facility] is posting 0 beds. [phone]  [facility] is posting 0 beds. [phone]    [facility] is posting 1 bed. Negative covid. [phone].  [facility] is posting 0 beds.  [phone]  [facility] is posting 0 beds [phone]     [facility] is posting 0 beds. LOW acuity ONLY. Mixed unit 12+. Negative covid- [phone]  [facility] has 1 bed posted. No aggression. Negative Covid. Low acuity.   [facility] is posting 0 beds. Negative covid. [phone].  [facility] is posting 0 beds. Low acuity only. Neg covid.  [phone]  [facility] is posting 1 beds. Low acuity. No current aggression.  [phone]    [facility] ([place] is posting 0 beds. Negative covid. Low acuity.  [phone].    [facility] is posting 0 bed. Low acuity. 72 HH hold preferred. Negative covid required. [phone] .  [facility] ([place] is posting 6 beds. Low acuity only. Neg covid.  [phone]  [facility] [place] is posting 6 beds.  Negative covid required.   Vol only, No history of aggression, violence, or assault. No sexual offenders. No 72 HH holds. [phone]       [facility] is posting 4 beds. Negative covid required.  (Must have the cognitive ability to do programming. No aggressive or violent behavior or recent HX in the last 2 yrs. MH must be primary.) Always low acuity. [phone]    [facility] has 2 beds posted. Negative  covid required.  Low acuity only. Violence and aggression capped.  [phone].  [facility] is posting 2 beds. Low acuity, Negative covid required. [phone];  [facility] posting 5 beds Negative covid required.  [phone];  [facility] [place] is posting 4 beds. Negative covid. LOW acuity. (No lines, drains, or tubes, oxygen, CPAP, IV, etc.) Must Have a Ride Home. [phone]    [facility] is posting 3 beds. Negative covid. (No. lines, drains, or tubes, oxygen, CPAP, IV, etc.) [phone];  [facility] is posting 16 beds. No covid test required. [phone];      11:53 PM PPS contacted HI and gave MRN to be reviewed for placement.    Pt remains on the work list pending appropriate bed availability.

## 2024-07-26 NOTE — PHARMACY-ADMISSION MEDICATION HISTORY
Pharmacist Admission Medication History    Admission medication history is complete. The information provided in this note is only as accurate as the sources available at the time of the update.    Information Source(s): Patient, CareEverywhere/SureScripts, and   via in-person and phone    Pertinent Information: reviewed fill history, spoke with patient, and confirmed last doses also with .     Changes made to PTA medication list:  Added: None  Deleted: None  Changed: None    Allergies reviewed with patient and updates made in EHR: no    Medication History Completed By: Yadi Barron [facility] 7/26/2024 9:52 AM    PTA Med List   Medication Sig Last Dose    escitalopram (LEXAPRO) 10 MG tablet Take 10 mg by mouth daily 7/25/2024 at AM    OLANZapine (ZYPREXA) 15 MG tablet Take 15 mg by mouth at bedtime 7/24/2024 at PM    OLANZapine (ZYPREXA) 5 MG tablet Take 1 tablet (5 mg) by mouth every morning 7/25/2024 at AM    prazosin (MINIPRESS) 1 MG capsule Take 1 mg by mouth at bedtime 7/24/2024 at PM
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
## 2024-07-26 NOTE — TELEPHONE ENCOUNTER
5:22 PM Paged Resident Jose to review for unit 20  5:44 PM Jso[facility] declined pt d/t acuity       R: [place] MH Access Inpatient Bed Call Log 7/26/24  @4:00 PM:    Intake has called facilities that have not updated the bed status within the last 12 hours.                             [facility] is at capacity           [facility] is posting 0 beds. [phone] Per call @8:47am, full currently  [facility] is posting 0 beds. Negative covid required  [facility] is posting 0 beds. Neg covid. No high school/[place]-psych. [phone].   [place] is posting 0 beds. [phone]  [facility] is posting 0 beds. [phone]   [facility] is posting 0 beds. Negative covid. [phone]. Per call @8:30am  [facility] is posting 0 beds [phone]    [facility] is posting 6 beds. LOW acuity ONLY. Mixed unit 12+. Negative covid- [phone]  [facility] has 0 beds posted. No aggression. Negative Covid. Low acuity.  [facility] is posting 2 beds. Low acuity only. Neg covid.  [phone]   [facility] is posting 2 bed. Low acuity. No current aggression.    [facility] is posting 0 beds. Negative covid. [phone]   [facility] is posting 0 beds available. Negative covid.  [phone].      [facility] is posting 0 bed. Low acuity. 72 HH hold preferred. Negative covid required. [phone]   [facility] ([place] is posting 7 beds. Low acuity only. Neg covid.  [phone]   [facility] [place] is posting 4 beds.  Negative covid required.   Vol only, No history of aggression, violence, or assault. No sexual offenders. No 72 HH holds. [phone]     [facility] is posting 4 beds. Negative covid required.  (Must have the cognitive ability to do programming. No aggressive or violent behavior or recent HX in the last 2 yrs. MH must be primary.) Always  low acuity. [phone]   [facility] has 2 beds posted. Negative covid required.  Low acuity only. Violence and aggression capped.  [phone] Per call, currently at divert.  [facility] is posting 2 beds. Low acuity, Negative covid required. [phone]   [facility] bed Negative covid required.  [phone]   [facility] [place] is posting 2 beds. Negative covid. LOW acuity. (No lines, drains, or tubes, oxygen, CPAP, IV, etc.) Must Have a Ride Home. [phone]   [facility] is posting 3 beds. Negative covid. (No. lines, drains, or tubes, oxygen, CPAP, IV, etc.)   [facility] is posting 16 beds. No covid test required. [phone] Per call @8:24am    Pt remains on the work list pending appropriate bed availability.

## 2024-07-26 NOTE — TELEPHONE ENCOUNTER
R: [place]  Access Inpatient Bed Call Log 7/25/24 @ 11:36PM     Intake has called facilities that have not updated the bed status within the last 12 hours.                             Statewide only:    [facility] is at capacity            [facility] is posting 0 beds. [phone];   [facility] is posting 0 beds. Negative covid required   [facility] is posting 0 beds. Neg covid. No high school/[place]-psych. [phone].   [facility] is posting 0 beds. [phone]   [facility] is posting 0 beds. [phone]    [facility] is posting 0 bed. Negative covid. [phone].   [facility] is posting 0 beds.  [phone]   [facility] is posting 0 beds [phone]      [facility] is posting 6 beds. LOW acuity ONLY. Mixed unit 12+. Negative covid- [phone] Per call at 9pm- at capacity [facility].   [facility] has 0 bed posted. No aggression. Negative Covid. Low acuity.    [facility] is posting 0 beds. Negative covid. [phone].   [facility] is posting 0 beds. Low acuity only. Neg covid.  [phone]    is posting 1 bed. Low acuity. No current aggression.  [phone]    [facility] is posting 0 beds. Negative covid. Low acuity.  [phone].    [facility] is posting 0 bed. Low acuity. 72 HH hold preferred. Negative covid required. [phone] .   [facility] is posting 8 beds. Low acuity only. Neg covid.  [phone].  Do not have any medical beds.     [facility] [place] is posting 0 beds.  Negative covid required.   Vol only, No history of aggression, violence, or assault. No sexual offenders. No 72 HH holds. [phone]       [facility] is posting 4 beds. Negative covid required.  (Must have the cognitive ability to do programming. No aggressive or violent behavior or recent HX in the last 2 yrs.  must  be primary.) Always low acuity. [phone]    [facility] has 2 beds posted. Negative covid required.  Low acuity only. Violence and aggression capped.  [phone].  [facility] is posting 2 beds. Low acuity, Negative covid required. [phone];   [facility] [place] posting 5 beds Negative covid required.  [phone];  They reported having only 2 this evening.   [facility] [place] is posting 4 beds. Negative covid. LOW acuity. (No lines, drains, or tubes, oxygen, CPAP, IV, etc.) Must Have a Ride Home. [phone]    [facility] is posting 3 beds. Negative covid. (No. lines, drains, or tubes, oxygen, CPAP, IV, etc.) [phone];     Pt remains on the work list pending appropriate bed availability.

## 2024-07-26 NOTE — PROGRESS NOTES
IP MH Referral Acuity Rating Score (RARS)    LMHP complete at referral to IP MH, with DEC; and, daily while awaiting IP MH placement. Call score to PPS.  CRITERIA SCORING   New 72 HH and Involuntary for IP MH (not adolescent) 1/1   Boarding over 24 hours 0/1   Vulnerable adult at least 55+ with multiple co morbidities; or, Patient age 11 or under 0/1   Suicide ideation without relief of precipitating factors 0/1   Current plan for suicide 0/1   Current plan for homicide 0/1   Imminent risk or actual attempt to seriously harm another without relief of factors precipitating the attempt 1/1   Severe dysfunction in daily living (ex: complete neglect for self care, extreme disruption in vegetative function, extreme deterioration in social interactions) 1/1   Recent (last 2 weeks) or current physical aggression in the ED 0/1   Restraints or seclusion episode in ED 0/1   Verbal aggression, agitation, yelling, etc., while in the ED 1/1   Active psychosis with psychomotor agitation or catatonia 1/1   Need for constant or near constant redirection (from leaving, from others, etc).  0/1   Intrusive or disruptive behaviors 1/1   TOTAL Acuity Total Score: 6

## 2024-07-26 NOTE — ED NOTES
"Brought patient dinner tray. Patient very polite. Apologized for behavior earlier stating \"it was inappropriate for me to behave that way. You are just doing your job\". Writer validated emotions, accepted apology, and encouraged patient to continue with good behavior and insight as those are goals of care for stabilization.  "

## 2024-07-26 NOTE — ED NOTES
Patient continues to pace around locked BH area, and yelling periodically. Patient agreeable to take a PRN zyprexa.

## 2024-07-26 NOTE — ED NOTES
"Patient pacing [facility] area, swearing and agitated about admission. Remains fixed on \"coming here for blood draw\". Multiple times writer attempted to verbally deescalated. Patient not receptive. Offered medication to which patient just started and writer and replied \"fucking hell! You can fucking take medications. You've got to be fucking kidding me!\" and walked away.    Dr Albrecht updated  "
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
## 2024-07-26 NOTE — TELEPHONE ENCOUNTER
R:   3:24 PM Paged Renetta to review for admission to [facility]. PPS following informed.
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
## 2024-07-27 ENCOUNTER — TELEPHONE (OUTPATIENT)
Dept: BEHAVIORAL HEALTH | Facility: CLINIC | Age: 24
End: 2024-07-27
Payer: COMMERCIAL

## 2024-07-27 PROCEDURE — 250N000013 HC RX MED GY IP 250 OP 250 PS 637: Performed by: EMERGENCY MEDICINE

## 2024-07-27 PROCEDURE — 250N000013 HC RX MED GY IP 250 OP 250 PS 637: Performed by: STUDENT IN AN ORGANIZED HEALTH CARE EDUCATION/TRAINING PROGRAM

## 2024-07-27 RX ADMIN — OLANZAPINE 5 MG: 5 TABLET, FILM COATED ORAL at 07:32

## 2024-07-27 RX ADMIN — PRAZOSIN HYDROCHLORIDE 1 MG: 1 CAPSULE ORAL at 21:19

## 2024-07-27 RX ADMIN — OLANZAPINE 15 MG: 5 TABLET, FILM COATED ORAL at 21:18

## 2024-07-27 RX ADMIN — ESCITALOPRAM OXALATE 10 MG: 10 TABLET, FILM COATED ORAL at 07:32

## 2024-07-27 RX ADMIN — NICOTINE POLACRILEX 2 MG: 2 GUM, CHEWING ORAL at 16:51

## 2024-07-27 NOTE — ED NOTES
"Patient intermittently up pacing commons area, loudly talking to self. What writer is able to hear are muttering about \"fentanyl\" \"gangs\". Interactions with staff are polite and appropriate however.   "

## 2024-07-27 NOTE — ED NOTES
Writer discussed with  [facility] clinician that pt has an order for DEC to assessment, However, pt is currently asleep.  Per [facility], an overnight DEC clinician to see pt most likely Tele-DEC.

## 2024-07-27 NOTE — ED NOTES
"Pt calm and cooperative . He asked : What was my COVID Test result\" Writer informed pt the result was Negative. Pt replied \" Great\" Pt informed will need urine for analysis. Pt replied \"Okay\".   "

## 2024-07-27 NOTE — ED NOTES
Pt cooperative with med administration.  Given scrubs and new underwear.  Given toiletries for morning hygiene.  Updated on POC. Pt calm/cooperative, pleasant.  Denies hallucinations.

## 2024-07-27 NOTE — ED NOTES
"VSS, pt calm and cooperative . He took his scheduled medication without issues and requested \" Some snack to eat\" Pt was provided the snacks.   "

## 2024-07-27 NOTE — ED NOTES
Patient provided lunch tray. Afterwards was pacing hallways. Writer approached patient who was very pleasant and asked how writer's day was going; said his day was going well. Pt denied needs at this time but writer offered a handful of options and patient accepted coffee which was provided.
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
## 2024-07-27 NOTE — TELEPHONE ENCOUNTER
R: [place]  Access Inpatient Bed Call Log 7/27/24 @7:05 am:    Intake has called facilities that have not updated the bed status within the last 12 hours.                       Pt not appropriate for beds available.         [facility] is posting 0 beds.           [facility] is [facility] 0 beds. [phone]; per call at 7:05 am to Kamilla, they are at cap.    [facility] is posting 0 beds. Negative covid required   [facility] is posting 0 beds. Neg covid. No high school/Elda-psych. [phone]. Per call at 7:06 am to Elaine, they are at cap.    [facility] is posting 0 beds. [phone]   [facility] is posting 0 beds. [phone]    [facility] is posting 0 beds. Ages 18-35. Negative covid. [phone]. Per call at 7:08 am, left vm asking for a call back re: bed avail.    [facility] is posting 0 beds.    [facility] is posting 1 bed. Low acuity. [phone]       [facility] is posting 5 beds. LOW acuity ONLY. Mixed unit 12+. Negative covid- [phone]   [facility] has 1 bed posted. No aggression. Negative Covid. Low acuity.   [facility] ([place]) is posting 1 bed. Low acuity only. Neg covid.  [phone]    [facility] is posting 3 beds. Low acuity. No current aggression.     [facility] is posting 0 beds. Negative covid. [phone]: per call at 7:13 am, recording says their adult and adol units are at cap.    [facility] ([place]) is posting 0 beds available. Negative covid.  [phone].       [facility] is posting 0 beds. Low acuity. 72 HH hold preferred. Negative covid required. [phone]: per call at 7:17 am to Libby, they are at cap.    [facility] ([facility]) is posting 7 beds. Low acuity only. Neg covid.  [phone]: per call at 7:19 am to Carmelita, they have 6  expected  beds avail today.    [facility] in [place] is posting 4 beds.  Negative covid required.   Vol  only, No history of aggression, violence, or assault. No sexual offenders. No 72 HH holds. [phone]        [facility] is posting 4 beds. Negative covid required.  (Must have the cognitive ability to do programming. No aggressive or violent behavior or recent HX in the last 2 yrs. MH must be primary.) Always low acuity. [phone]    [facility] has 1 bed posted. Negative covid required.  Low acuity only. Violence and aggression capped.  [phone]    [facility] is posting 2 beds. Low acuity, Negative covid required. [phone]: per call at 7:23 am to Clair, they are at cap.    [facility] [place] posting 0 beds. Negative covid required.  [phone]: per call at 7:27 am to MELI Marquissey, they have 1 step-down/low acuity bed avail. Declined 7/26 due to pt's acuity.   [facility] [place] is posting 2 beds. Negative covid. LOW acuity. (No lines, drains, or tubes, oxygen, CPAP, IV, etc.) Must Have a Ride Home. [phone]: per call at 7:31 am to Rizwana, they have 1 bed avail.    [facility] is posting 3 beds. Negative covid. [phone] (No. lines, drains, or tubes, oxygen, CPAP, IV, etc.): per call at 7:34 am to Ashleigh, they have 5 beds avail. Per call at 11:23 am to Ashleigh, they can review pt. Faxed copy of hold, clinical, labs and face sheet at 11:30 am. Per call at 2:41 pm to Ashleigh for an update, she said she will be calling the provider soon and will then call intake back. Per Ashleigh at 3:13 pm, she will discuss w/ her provider and will then call back.  [facility] is posting 16 beds. No covid test required. [phone]: per call at 7:27 am, no answer after phone rang for over 1 minute. Facility is in [place]. Pt is on a hold. Per intake policy, pts on holds can not be presented for review to out state facilities.       Author called [facility] in ED at 11:20 am and requested that they try to collect the Utox.   Pt remains on the work list  pending appropriate bed availability.

## 2024-07-28 ENCOUNTER — TELEPHONE (OUTPATIENT)
Dept: BEHAVIORAL HEALTH | Facility: CLINIC | Age: 24
End: 2024-07-28
Payer: COMMERCIAL

## 2024-07-28 PROBLEM — F31.10 BIPOLAR I DISORDER, MOST RECENT EPISODE (OR CURRENT) MANIC (H): Status: ACTIVE | Noted: 2024-07-28

## 2024-07-28 PROCEDURE — 250N000013 HC RX MED GY IP 250 OP 250 PS 637: Performed by: STUDENT IN AN ORGANIZED HEALTH CARE EDUCATION/TRAINING PROGRAM

## 2024-07-28 RX ADMIN — PRAZOSIN HYDROCHLORIDE 1 MG: 1 CAPSULE ORAL at 21:37

## 2024-07-28 RX ADMIN — OLANZAPINE 5 MG: 5 TABLET, FILM COATED ORAL at 08:41

## 2024-07-28 RX ADMIN — ESCITALOPRAM OXALATE 10 MG: 10 TABLET, FILM COATED ORAL at 08:41

## 2024-07-28 RX ADMIN — OLANZAPINE 15 MG: 5 TABLET, FILM COATED ORAL at 21:37

## 2024-07-28 NOTE — TELEPHONE ENCOUNTER
R:   No beds within [facility].   Pt placed on a 72 HH on 7/25/24.  [place] reviewing -  Awaiting completion of UTOX for review @ 8am.  No Utox completed as of 8am.  Will fax UTOX results to [facility] when completed.     Utox still incomplete for outside bed review.       Pt will remain on worklist.  For outside bed review - Need UTOX completed.

## 2024-07-28 NOTE — ED NOTES
Patient given breakfast tray and agreeable to taking morning medications. Patient calm and cooperative with staff.

## 2024-07-28 NOTE — TELEPHONE ENCOUNTER
R: [place]  Access Inpatient Bed Call Log 7/28/24 @3:20 PM:      Intake has called facilities that have not updated the bed status within the last 12 hours.                                   4:50 PM [facility] CAN REVIEW WITH UTOX     9:20 PM STILL NO UTOX COLLECTED     [facility] is posting 0 beds.            [facility] is posting 0 beds. [phone];  per call at 7:04 am to [facility], they are at cap.     [facility] is posting 0 beds. Negative covid required    [facility] is posting 0 beds. Neg covid. No high school/Elda-psych. [phone].  Per call at 7:09 am to Woden, they are at cap.     [facility] is posting 0 beds. [phone]    [facility] is posting 0 beds. [phone]     [facility] is posting 0 beds. Ages 18-35. Negative covid. [phone]. Per call at 7:10 am, left a  asking for a call back re: bed avail.     [facility] is posting 0 beds.     [facility] ([facility]) is posting 0 beds. Low acuity. [phone]         [facility] is posting 5 beds. LOW acuity ONLY. Mixed unit 12+. Negative covid- [phone]    [facility] has 1 bed posted. No aggression. Negative Covid. Low acuity.    [facility] ([place]) is posting 0 beds. Low acuity only. Neg covid.  [phone]     [facility] is posting 1 bed. Low acuity. No current aggression.      [facility] is posting 0 beds. Negative covid. [phone]: per call at 7:17 am, recording said their adult and adol units are at cap.     [facility] ([place] is posting 0 beds available. Negative covid.  [phone].        [facility] is posting 0 beds. Low acuity. 72 HH hold preferred. Negative covid required. [phone]: per call at 7:21 am to Nadeen, they are at cap.     [facility] ([facility]) is posting 7 beds. Low acuity only. Neg covid.  [phone]: per call at 7:23 am to  Theresa, they have 4 beds avail.      [facility] in [place]  is posting 4 beds.  Negative covid required.   Vol only, No history of aggression, violence, or assault. No sexual offenders. No 72 HH holds. [phone]          [facility] is posting 5 beds. Negative covid required.  (Must have the cognitive ability to do programming. No aggressive or violent behavior or recent HX in the last 2 yrs. MH must be primary.) Always low acuity. [phone]     [facility] has 0 beds posted. Negative covid required.  Low acuity only. Violence and aggression capped.  [phone]     [facility] is posting 2 beds. Low acuity, Negative covid required. [phone]: per call at 7:27 am to Dixie, they may have 2-3 beds avail.     [facility] posting 1 beds. Negative covid required.  [phone]: per call at 7:31 am to MELI Dave, they have 1 step-down/low acuity bed avail.     [facility]. Negative covid. LOW acuity. (No lines, drains, or tubes, oxygen, CPAP, IV, etc.) Must Have a Ride Home. [phone]: per call at 7:33 am to Rizwana, they possibly have 1 bed avail.    [facility] is posting 3 beds. Negative covid. [phone] (No. lines, drains, or tubes, oxygen, CPAP, IV, etc.) per call at 7:35 am to Samantha, they possibly have 2 beds, 1 general unit and 1 high acuity.            Pt remains on the work list pending appropriate bed availability.

## 2024-07-28 NOTE — ED NOTES
Placement called requested unit(s) tox for patient stating that [facility] is looking at pt for possible admission     Evening med given (see MAR) Pt even chest rise and fall breathing even easy and unlabored resting on cart in room

## 2024-07-28 NOTE — ED NOTES
IP MH Referral Acuity Rating Score (RARS)    LMHP complete at referral to IP MH, with DEC; and, daily while awaiting IP MH placement. Call score to PPS.  CRITERIA SCORING   New 72 HH and Involuntary for IP MH (not adolescent) 1/1   Boarding over 24 hours 1/1   Vulnerable adult at least 55+ with multiple co morbidities; or, Patient age 11 or under 0/1   Suicide ideation without relief of precipitating factors 0/1   Current plan for suicide 0/1   Current plan for homicide 0/1   Imminent risk or actual attempt to seriously harm another without relief of factors precipitating the attempt 0/1   Severe dysfunction in daily living (ex: complete neglect for self care, extreme disruption in vegetative function, extreme deterioration in social interactions) 1/1   Recent (last 2 weeks) or current physical aggression in the ED 0/1   Restraints or seclusion episode in ED 1/1   Verbal aggression, agitation, yelling, etc., while in the ED 1/1   Active psychosis with psychomotor agitation or catatonia 0/1   Need for constant or near constant redirection (from leaving, from others, etc).  0/1   Intrusive or disruptive behaviors 0/1   TOTAL Acuity Total Score: 5     HAYLEY Sun

## 2024-07-28 NOTE — ED NOTES
Pt resting on cart in room Pt even chest rise and fall breathing even easy and unlabored adjusting self independently in bed

## 2024-07-28 NOTE — PROGRESS NOTES
"Triage & Transition Services, Extended Care     Therapy Progress Note    Patient: Jw goes by \"Jw,\" uses he/him pronouns  Date of Service: July 28, 2024  Site of Service: [facility] EMERGENCY DEPT                             BH3  Patient was seen yes  Mode of Assessment: In person    Presentation Summary: This writer observes pt to be pacing back and forth in the milieu. He is agreeable to meeting with this writer to discuss his mental health concerns. When asked about his understanding of what brought pt to the ED, he reports that he has been doing \"back flips\" in his basement bedroom to distract himself from his group home, which group home staff labeled as \"aggression\". He goes onto say that his group home smells of urine and feces and that he has been leaving the group home to bike as a distraction. When prompted further, he denies any mental health concerns, including SI, HI, or paranoia. He tells this writer that he \"loves people too much\" to harm them. Pt has minimal insight into any underlying mental health concerns. He reports that he does have any psychiatric conditions, does not need to take psychiatric medication, and does not need to meet with any outpatient mental health providers. He tells this writer that he is in a group home as this was offered as an alternative to prison to him. Per pt, he had a Rule 20 hearing approximately 4 years ago after being brought to prison for a physical alteraction. This hearing determined that he had a psychiatric condition and was in need of a group home. Pt believes that this recommendation was made as a \"deal\" to prevent pt from being homeless in the winter. Pt tells this writer that he did not know that he would be transferring to an inpatient psychiatric unit but is aware that he is on a hold. 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    Therapeutic Intervention(s) Provided: Engaged in guided discovery, explored patient's perspectives and helped expand them through socratic dialogue.    Current Symptoms:       impulsive, hyperactive      Mental Status Exam   Affect: Dramatic  Appearance: Appropriate  Attention Span/Concentration: Attentive  Eye Contact: Intense    Fund of Knowledge: Appropriate   Language /Speech Content: Fluent  Language /Speech Volume: Normal  Language /Speech Rate/Productions: Normal  Recent Memory: Variable  Remote Memory: Variable  Mood: Normal  Orientation to Person: Yes   Orientation to Place: Yes  Orientation to Time of Day: Yes  Orientation to Date: Yes     Situation (Do they understand why they are here?): No  Psychomotor Behavior: Hyperactive  Thought Content: Clear  Thought Form: Intact    Treatment Objective(s) Addressed: processing feelings, assessing safety    Patient Response to Interventions: verbalizes understanding    Progress Towards Goals: Patient Reports Symptoms Are: ongoing  Patient Progress Toward Goals: is not making progress  Comment: Pt continues to present with minimal insight into his mental health concerns and does not believe that he needs to take psychiatric medication.  Next Step to Work Toward Discharge: symptom stabilization  Symptom Stabilization Comment: Pt continues to present with minimal insight into his mental health concerns and does not believe that he needs to take psychiatric medication.    Case Management: Case Management Included: collaborating with patient's support system  Details on Collaborating with Patient's Support System: Coordinated with RN staff and attempted to coordinate with Pt's , Teofilo Justin ([phone])  Summary of Interaction: While Pt denies any mental health symptoms, collaboration with RN staff report Pt has been physically agitated while in the ED and needed medication to  assist. Writer left voicemail with Teofilo with instruction to call back to discuss commitment status and disposition planning.    Plan: inpatient mental health  yes provider, RN Dr. Oh, in agreement  no (72 HH)    Clinical Substantiation: It is the recommendation of this writer that pt be admitted to an inpatient psychiatric unit for concerns of yojana and psychosis. Pt is currently on a 72 HH expiring on 7/30/24 at 2119. Pt has limited insight into his mental health concerns and does not believe that he needs to take psychiatric medication. However, collateral report from group home staff indicate that he has been making repeated homicidal statements toward his family, exhibiting paranoia about people wanting to kill him, and engaging in high-risk behavior, such as stealing cars and inviting homeless people into the group home.    Legal Status: Legal Status at Admission: 72 Hour Hold  72 Hour Hold - Date/Time Initiated: 07/25/24 2119  72 Hour Hold - Date/Time Ends: 07/30/24 2119    Session Status: Time session started: 1350  Time session ended: 1410  Session Duration (minutes): 20 minutes  Session Number: 2  Anticipated number of sessions or this episode of care: 3    Time Spent: 20 minutes    CPT Code: CPT Codes: 01236 - Psychotherapy (with patient) - 30 (16-37*) min    Diagnosis:   Patient Active Problem List   Diagnosis Code    Unspecified mood (affective) disorder (H24) F39    Schizophrenia (H) F20.9    Bipolar I disorder, most recent episode (or current) manic (H) F31.10       Primary Problem This Admission: Active Hospital Problems    Bipolar I disorder, most recent episode (or current) manic (H) F31.10    Natalie De Guzman, [facility]   Licensed Mental Health Professional (LMHP), [facility]  [phone]

## 2024-07-28 NOTE — TELEPHONE ENCOUNTER
R:  9:54 PM Discussed Pt with Madhuri at [facility].  Pt still has no Utox and per Madhuri, the Pt cannot be accepted until the Utox results are faxed over.      9:56 PM Called [facility] ED and asked for the Utox lab again; Per RN Pt is already in bed sleeping but they will keep trying.

## 2024-07-28 NOTE — TELEPHONE ENCOUNTER
12:54AM - Called [facility] to update Madhuri that PPS still awaiting collection of UDS, but will fax once it is resulted. Madhuri reports can only present to provider once UDS is faxed over, but there is no provider until 6AM.    PPS awaiting resulted UDS to fax to [facility] for further review

## 2024-07-29 ENCOUNTER — TELEPHONE (OUTPATIENT)
Dept: BEHAVIORAL HEALTH | Facility: CLINIC | Age: 24
End: 2024-07-29
Payer: COMMERCIAL

## 2024-07-29 ENCOUNTER — MEDICAL CORRESPONDENCE (OUTPATIENT)
Dept: EMERGENCY MEDICINE | Facility: CLINIC | Age: 24
End: 2024-07-29
Payer: COMMERCIAL

## 2024-07-29 ENCOUNTER — HOSPITAL ENCOUNTER (INPATIENT)
Facility: CLINIC | Age: 24
LOS: 14 days | Discharge: NURSING, REHAB OR LONG-TERM CARE | End: 2024-08-12
Attending: PSYCHIATRY & NEUROLOGY | Admitting: PSYCHIATRY & NEUROLOGY
Payer: COMMERCIAL

## 2024-07-29 VITALS
TEMPERATURE: 98 F | RESPIRATION RATE: 18 BRPM | HEART RATE: 86 BPM | OXYGEN SATURATION: 98 % | SYSTOLIC BLOOD PRESSURE: 132 MMHG | WEIGHT: 140 LBS | BODY MASS INDEX: 22.5 KG/M2 | HEIGHT: 66 IN | DIASTOLIC BLOOD PRESSURE: 74 MMHG

## 2024-07-29 DIAGNOSIS — F43.10 PTSD (POST-TRAUMATIC STRESS DISORDER): ICD-10-CM

## 2024-07-29 DIAGNOSIS — Z91.148 NONADHERENCE TO MEDICATION: ICD-10-CM

## 2024-07-29 DIAGNOSIS — F20.3 UNDIFFERENTIATED SCHIZOPHRENIA (H): Primary | ICD-10-CM

## 2024-07-29 DIAGNOSIS — F32.A DEPRESSION, UNSPECIFIED DEPRESSION TYPE: ICD-10-CM

## 2024-07-29 LAB — TSH SERPL DL<=0.005 MIU/L-ACNC: 0.5 UIU/ML (ref 0.3–4.2)

## 2024-07-29 PROCEDURE — 250N000013 HC RX MED GY IP 250 OP 250 PS 637: Performed by: STUDENT IN AN ORGANIZED HEALTH CARE EDUCATION/TRAINING PROGRAM

## 2024-07-29 PROCEDURE — 124N000002 HC R&B MH UMMC

## 2024-07-29 PROCEDURE — 99417 PROLNG OP E/M EACH 15 MIN: CPT | Performed by: STUDENT IN AN ORGANIZED HEALTH CARE EDUCATION/TRAINING PROGRAM

## 2024-07-29 PROCEDURE — 250N000013 HC RX MED GY IP 250 OP 250 PS 637: Performed by: EMERGENCY MEDICINE

## 2024-07-29 PROCEDURE — 99245 OFF/OP CONSLTJ NEW/EST HI 55: CPT | Performed by: STUDENT IN AN ORGANIZED HEALTH CARE EDUCATION/TRAINING PROGRAM

## 2024-07-29 PROCEDURE — 250N000013 HC RX MED GY IP 250 OP 250 PS 637: Performed by: REGISTERED NURSE

## 2024-07-29 RX ORDER — HYDROXYZINE HYDROCHLORIDE 25 MG/1
25 TABLET, FILM COATED ORAL EVERY 4 HOURS PRN
Status: DISCONTINUED | OUTPATIENT
Start: 2024-07-29 | End: 2024-08-12 | Stop reason: HOSPADM

## 2024-07-29 RX ORDER — MAGNESIUM HYDROXIDE/ALUMINUM HYDROXICE/SIMETHICONE 120; 1200; 1200 MG/30ML; MG/30ML; MG/30ML
30 SUSPENSION ORAL EVERY 4 HOURS PRN
Status: DISCONTINUED | OUTPATIENT
Start: 2024-07-29 | End: 2024-08-12 | Stop reason: HOSPADM

## 2024-07-29 RX ORDER — OLANZAPINE 15 MG/1
15 TABLET ORAL AT BEDTIME
Status: DISCONTINUED | OUTPATIENT
Start: 2024-07-29 | End: 2024-08-09

## 2024-07-29 RX ORDER — ACETAMINOPHEN 325 MG/1
650 TABLET ORAL EVERY 4 HOURS PRN
Status: DISCONTINUED | OUTPATIENT
Start: 2024-07-29 | End: 2024-08-12 | Stop reason: HOSPADM

## 2024-07-29 RX ORDER — AMOXICILLIN 250 MG
1 CAPSULE ORAL 2 TIMES DAILY PRN
Status: DISCONTINUED | OUTPATIENT
Start: 2024-07-29 | End: 2024-08-12 | Stop reason: HOSPADM

## 2024-07-29 RX ORDER — TRAZODONE HYDROCHLORIDE 50 MG/1
50 TABLET, FILM COATED ORAL
Status: DISCONTINUED | OUTPATIENT
Start: 2024-07-29 | End: 2024-08-12 | Stop reason: HOSPADM

## 2024-07-29 RX ORDER — PRAZOSIN HYDROCHLORIDE 1 MG/1
1 CAPSULE ORAL AT BEDTIME
Status: DISCONTINUED | OUTPATIENT
Start: 2024-07-29 | End: 2024-08-12 | Stop reason: HOSPADM

## 2024-07-29 RX ORDER — ESCITALOPRAM OXALATE 5 MG/1
10 TABLET ORAL DAILY
Status: DISCONTINUED | OUTPATIENT
Start: 2024-07-30 | End: 2024-08-12 | Stop reason: HOSPADM

## 2024-07-29 RX ORDER — OLANZAPINE 5 MG/1
5 TABLET ORAL EVERY MORNING
Status: DISCONTINUED | OUTPATIENT
Start: 2024-07-30 | End: 2024-08-09

## 2024-07-29 RX ORDER — OLANZAPINE 10 MG/2ML
10 INJECTION, POWDER, FOR SOLUTION INTRAMUSCULAR 3 TIMES DAILY PRN
Status: DISCONTINUED | OUTPATIENT
Start: 2024-07-29 | End: 2024-08-12 | Stop reason: HOSPADM

## 2024-07-29 RX ORDER — OLANZAPINE 10 MG/1
10 TABLET ORAL 3 TIMES DAILY PRN
Status: DISCONTINUED | OUTPATIENT
Start: 2024-07-29 | End: 2024-08-12 | Stop reason: HOSPADM

## 2024-07-29 RX ADMIN — PRAZOSIN HYDROCHLORIDE 1 MG: 1 CAPSULE ORAL at 21:25

## 2024-07-29 RX ADMIN — NICOTINE POLACRILEX 2 MG: 2 GUM, CHEWING ORAL at 11:20

## 2024-07-29 RX ADMIN — OLANZAPINE 5 MG: 5 TABLET, FILM COATED ORAL at 08:36

## 2024-07-29 RX ADMIN — ESCITALOPRAM OXALATE 10 MG: 10 TABLET, FILM COATED ORAL at 08:36

## 2024-07-29 RX ADMIN — OLANZAPINE 15 MG: 15 TABLET, FILM COATED ORAL at 21:24

## 2024-07-29 ASSESSMENT — ACTIVITIES OF DAILY LIVING (ADL)
ADLS_ACUITY_SCORE: 35
ADLS_ACUITY_SCORE: 35
ADLS_ACUITY_SCORE: 28
ADLS_ACUITY_SCORE: 28
ADLS_ACUITY_SCORE: 35
ADLS_ACUITY_SCORE: 35
ADLS_ACUITY_SCORE: 28
LAUNDRY: WITH SUPERVISION
ADLS_ACUITY_SCORE: 35
HYGIENE/GROOMING: INDEPENDENT
ADLS_ACUITY_SCORE: 35
ADLS_ACUITY_SCORE: 28
ADLS_ACUITY_SCORE: 35
ADLS_ACUITY_SCORE: 28
ADLS_ACUITY_SCORE: 35
ADLS_ACUITY_SCORE: 35
DRESS: INDEPENDENT
ORAL_HYGIENE: INDEPENDENT

## 2024-07-29 NOTE — TELEPHONE ENCOUNTER
R:  No bed availability within [facility] @ 8:30am.     [place] Bed search initiated:     [facility] is posting 0 beds. [phone]; per call at 7:06 am to Edgardo, they are at cap.    [facility] is posting 0 beds. Negative covid required   [facility] is posting 0 beds. Neg covid. No high school/[place]lda-psych. [phone].  per call at 7:07 am to Denise, they are at cap.  [facility] is posting 0 beds. [phone]   [facility] is posting 0 beds. [phone]    [facility] is posting 0 beds. Ages 18-35. Negative covid. [phone]. Per call at 7:09 am to Ely, they have a couple y/a beds avail, a handful of adol, no child beds and no alma beds avail.    [facility] is posting 0 beds.    [facility] is posting 0 beds. Low acuity. [phone]     Pt remains on the work list pending appropriate bed availability.

## 2024-07-29 NOTE — TELEPHONE ENCOUNTER
R:  Bed on unit 32 opened.  Intake paged Ruddy to review pt for unit 32.     LALA Zarate called intake back @ 12:33pm and willing to accept if the Petition for commitment is submitted, as 72HH is over the 36 hours.       PPS called Extended care @ 12:42PM and was forwarded to [facility] to speak to AUBRIE/Jose G.    Jose G reports pt is meeting with Psychiatry now and he will call writer back after Psychiatry done meeting with pt to determine if petitioning for  Commitment.  Ruddy will accept if a petition is completed- so awaiting Psyc meeting determination.      AUBRIE Araiza updated PPS Writer that they ARE pursuing commitment.    Intake called unit 32 @ 2:12pm to inform pt in queue    Pt added to admit board    Sveta POLK updated of placement @ 2:15pm    Nikki park

## 2024-07-29 NOTE — TELEPHONE ENCOUNTER
R:  [place]  Access Inpatient Bed Call Log 7/28/24 @ 11:30PM  Intake has called facilities that have not updated their bed status within the last 12 hours.    [facility]     [facility]: @ cap  [facility]: @ cap per website   [facility]: @ cap per website   [facility]: @ cap per website. Per Gloria @ 11:31PM, they are full  [facility]: @ cap per website   [facility]: @ cap per website   [facility]: @ cap per website (Young Adult unit: No recent aggressions, violence or sexual assault. Neg covid required).     [facility]: @ cap per website   [facility]: @ cap per website   [facility]: @ cap per website   [facility]: @ cap per website. Mixed unit/Low acuity only.   [facility]: Posting 1 bed.  Per [facility] @ 11:32PM, they are at full capacity    [facility]: @ cap per website   [facility]: Posting 1 bed.  Per [facility] @ 11:32PM, they are at full capacity    [facility]: Does not review after 10PM.     [facility]: @ cap per website   [facility]: @ cap per website   [facility]: Posting 7 beds. Per Mar @ 11:34PM, 1-2 low acuity beds available   [facility]: Posting 5 beds (No hx of aggression. No sexual offenders. Voluntary patients only). Meets exclusionary d/t 72 HH   [facility]: Posting 4 beds (Always low acuity only. Must have the cognitive ability to do programming. No aggressive or violent behavior or recent HX in the last 2 yrs. MH must be primary). Pt not appropriate d/t acuity/hx of aggression    [facility]: @ cap per website   [facility]: Posting 2 beds (Low acuity, Neg Covid). Per Diana @ 11:36PM, they are full  [facility]: Posting 1 bed. Per Jose G @ 11:37PM, no beds [facility]: @ cap per website (No hx of aggression/assault. No lines, drains or tubes. Does not provide detox or CD treatment. Must have confirmed ride home upon discharge).   [facility]: Posting 8 beds. Facility is in [place]. Per PPS  policy, patients must be voluntary for placement in [place] [facility]: Posting 3 beds (Mixed unit/Low acuity). Per Phyliss @ 11:40PM, no high acuity beds tonight          Pt remains on work list until appropriate placement is available

## 2024-07-29 NOTE — PROGRESS NOTES
"Triage & Transition Services, Extended Care     Therapy Progress Note    Patient: Jw goes by \"Jw,\" uses he/him pronouns  Date of Service: July 29, 2024  Site of Service: [facility] EMERGENCY DEPT                             BH3  Patient was seen yes  Mode of Assessment: In person    Presentation Summary: Writer met with Pt in his room. Pt was agreeable to meet with Writer. Writer reintroduced himself. Writer informed Pt the plan of having Pt meet with an attending psychiatric provider to discuss disposition recommendation regarding Pt's plan of care, either discharging or remaining in the hospital and pursuing inpatient level of care. Pt stated he understood. When prompted by Writer, Pt continued to deny any mental health symptoms. Writer asked if Pt had any requests or desires at this time, Pt denied any and was observed eating his breakfast.    Therapeutic Intervention(s) Provided: Engaged in guided discovery, explored patient's perspectives and helped expand them through socratic dialogue., Engaged in social skills training.    Current Symptoms:       impulsive, hyperactive      Mental Status Exam   Affect: Appropriate  Appearance: Appropriate  Attention Span/Concentration: Attentive  Eye Contact: Intense    Fund of Knowledge: Appropriate   Language /Speech Content: Fluent  Language /Speech Volume: Normal  Language /Speech Rate/Productions: Pressured  Recent Memory: Variable  Remote Memory: Variable  Mood: Normal  Orientation to Person: Yes   Orientation to Place: Yes  Orientation to Time of Day: Yes  Orientation to Date: Yes     Situation (Do they understand why they are here?): Yes  Psychomotor Behavior: Hyperactive  Thought Content: Clear  Thought Form: Intact    Treatment Objective(s) Addressed: processing feelings, assessing safety, identifying treatment goals    Patient Response to Interventions: verbalizes understanding    Progress Towards Goals: Patient Reports Symptoms " Are: ongoing  Patient Progress Toward Goals: other  Comment: Pt continues to present with minimal insight into his mental health concerns and does not believe that he needs to take psychiatric medication. While in the ED, it would appear that Pt has been medication compliant.  Next Step to Work Toward Discharge: symptom stabilization  Symptom Stabilization Comment: Pt continues to present with minimal insight into his mental health concerns and does not believe that he needs to take psychiatric medication. While in the ED, it would appear that Pt has been medication compliant.    Case Management: Case Management Included: collaborating with patient's support system  Details on Collaborating with Patient's Support System: Coordinated with attending psychiatric provider, Dennis Evans MD.  Summary of Interaction: Writer communicated with Dennis Evans MD after meeting with Pt. It was determined we will petition for commitment. Paperwork was faxed to [facility].    Plan: inpatient mental health  yes provider, LINDSEY barfield    Clinical Substantiation: It is the recommendation that Pt be admitted to an inpatient psychiatric unit for concerns of yojana and psychosis. Pt is currently on a 72 HH expiring on 7/30/24 at 2119. Pt has limited insight into his mental health concerns and does not believe that he needs to take psychiatric medication. However, collateral report from group home staff indicate that he has been making repeated homicidal statements toward his family, exhibiting paranoia about people wanting to kill him, and engaging in high-risk behavior, such as stealing cars and inviting homeless people into the group home.    Writer communicated with Dennis Evans MD after meeting with Pt. It was determined we will petition for commitment. Paperwork was faxed to [facility].    Legal Status: Legal Status at Admission: 72 Hour Hold  72 Hour Hold - Date/Time Initiated: 07/25/24 2120  72 Hour Hold  - Date/Time Ends: 07/30/24 2120    Session Status: Time session started: 0825  Time session ended: 0830  Session Duration (minutes): 5 minutes  Session Number: 3  Anticipated number of sessions or this episode of care: 3    Time Spent: 5 minutes    CPT Code: CPT Codes: Non-Billable    Diagnosis:   Patient Active Problem List   Diagnosis Code    Unspecified mood (affective) disorder (H24) F39    Schizophrenia (H) F20.9    Bipolar I disorder, most recent episode (or current) manic (H) F31.10       Primary Problem This Admission: Active Hospital Problems    Bipolar I disorder, most recent episode (or current) manic (H)        [facility]   Licensed Mental Health Professional (LMHP), Extended Care  [phone]

## 2024-07-29 NOTE — ED NOTES
Pleasant, cooperative, no behavior problems, he denies hearing any auditory or visual hallucinations. He denies refusing medications at the Group Home.  I informed patient that he is going to [place] [id number], and he agreed.

## 2024-07-29 NOTE — PLAN OF CARE
07/29/24 1842   Patient Belongings   Did you bring any home meds/supplements to the hospital?  No   Patient Belongings locker;sent to security per site process   Patient Belongings Put in Hospital Secure Location (Security or Locker, etc.) cash/credit card;clothing;cell phone/electronics;wallet;shoes   Belongings Search Yes   Clothing Search Yes   Second Staff Darvin Rico     Patient Belongings:     1 pair of slippers, 1 shirt, 1 pants, 1 wallet, 1 underwear , 1 phone     Sent to Security (Envelope # [id number]):     1 serve [id number]  1 serve [id number]  1 debit visa [id number]     A               Admission:  I am responsible for any personal items that are not sent to the safe or pharmacy.  [facility] is not responsible for loss, theft or damage of any property in my possession.    Signature:  _________________________________ Date: _______  Time: _____                                              Staff Signature:  ____________________________ Date: ________  Time: _____      2nd Staff person, if patient is unable/unwilling to sign:    Signature: ________________________________ Date: ________  Time: _____     Discharge:  [facility] has returned all of my personal belongings:    Signature: _________________________________ Date: ________  Time: _____                                          Staff Signature:  ____________________________ Date: ________  Time: _____

## 2024-07-29 NOTE — ED NOTES
Pt sleeping.  Note rise and fall of chest.  Color within normal limits.  Repositions self independently
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
## 2024-07-30 LAB
ALBUMIN SERPL BCG-MCNC: 4.2 G/DL (ref 3.5–5.2)
ALP SERPL-CCNC: 92 U/L (ref 40–150)
ALT SERPL W P-5'-P-CCNC: 39 U/L (ref 0–70)
AMPHETAMINES UR QL SCN: NORMAL
ANION GAP SERPL CALCULATED.3IONS-SCNC: 8 MMOL/L (ref 7–15)
AST SERPL W P-5'-P-CCNC: 29 U/L (ref 0–45)
BARBITURATES UR QL SCN: NORMAL
BASOPHILS # BLD AUTO: 0.1 10E3/UL (ref 0–0.2)
BASOPHILS NFR BLD AUTO: 1 %
BENZODIAZ UR QL SCN: NORMAL
BILIRUB SERPL-MCNC: 0.6 MG/DL
BUN SERPL-MCNC: 18.4 MG/DL (ref 6–20)
BZE UR QL SCN: NORMAL
CALCIUM SERPL-MCNC: 8.8 MG/DL (ref 8.8–10.4)
CANNABINOIDS UR QL SCN: NORMAL
CHLORIDE SERPL-SCNC: 106 MMOL/L (ref 98–107)
CHOLEST SERPL-MCNC: 115 MG/DL
CREAT SERPL-MCNC: 0.94 MG/DL (ref 0.67–1.17)
EGFRCR SERPLBLD CKD-EPI 2021: >90 ML/MIN/1.73M2
EOSINOPHIL # BLD AUTO: 0.1 10E3/UL (ref 0–0.7)
EOSINOPHIL NFR BLD AUTO: 1 %
ERYTHROCYTE [DISTWIDTH] IN BLOOD BY AUTOMATED COUNT: 13.8 % (ref 10–15)
FENTANYL UR QL: NORMAL
FOLATE SERPL-MCNC: 10.1 NG/ML (ref 4.6–34.8)
GGT SERPL-CCNC: 26 U/L (ref 8–61)
GLUCOSE SERPL-MCNC: 88 MG/DL (ref 70–99)
HBA1C MFR BLD: 5.3 %
HCO3 SERPL-SCNC: 27 MMOL/L (ref 22–29)
HCT VFR BLD AUTO: 48 % (ref 40–53)
HDLC SERPL-MCNC: 41 MG/DL
HGB BLD-MCNC: 15.8 G/DL (ref 13.3–17.7)
IMM GRANULOCYTES # BLD: 0 10E3/UL
IMM GRANULOCYTES NFR BLD: 0 %
LDLC SERPL CALC-MCNC: 62 MG/DL
LYMPHOCYTES # BLD AUTO: 1.9 10E3/UL (ref 0.8–5.3)
LYMPHOCYTES NFR BLD AUTO: 27 %
MCH RBC QN AUTO: 27 PG (ref 26.5–33)
MCHC RBC AUTO-ENTMCNC: 32.9 G/DL (ref 31.5–36.5)
MCV RBC AUTO: 82 FL (ref 78–100)
MONOCYTES # BLD AUTO: 0.7 10E3/UL (ref 0–1.3)
MONOCYTES NFR BLD AUTO: 10 %
NEUTROPHILS # BLD AUTO: 4.1 10E3/UL (ref 1.6–8.3)
NEUTROPHILS NFR BLD AUTO: 61 %
NONHDLC SERPL-MCNC: 74 MG/DL
NRBC # BLD AUTO: 0 10E3/UL
NRBC BLD AUTO-RTO: 0 /100
OPIATES UR QL SCN: NORMAL
PCP QUAL URINE (ROCHE): NORMAL
PLATELET # BLD AUTO: 328 10E3/UL (ref 150–450)
POTASSIUM SERPL-SCNC: 4.4 MMOL/L (ref 3.4–5.3)
PROT SERPL-MCNC: 7 G/DL (ref 6.4–8.3)
RBC # BLD AUTO: 5.85 10E6/UL (ref 4.4–5.9)
SODIUM SERPL-SCNC: 141 MMOL/L (ref 135–145)
TRIGL SERPL-MCNC: 58 MG/DL
VIT B12 SERPL-MCNC: 491 PG/ML (ref 232–1245)
VIT D+METAB SERPL-MCNC: 27 NG/ML (ref 20–50)
WBC # BLD AUTO: 6.8 10E3/UL (ref 4–11)

## 2024-07-30 PROCEDURE — 85025 COMPLETE CBC W/AUTO DIFF WBC: CPT | Performed by: REGISTERED NURSE

## 2024-07-30 PROCEDURE — 36415 COLL VENOUS BLD VENIPUNCTURE: CPT | Performed by: REGISTERED NURSE

## 2024-07-30 PROCEDURE — 84075 ASSAY ALKALINE PHOSPHATASE: CPT | Performed by: REGISTERED NURSE

## 2024-07-30 PROCEDURE — G0177 OPPS/PHP; TRAIN & EDUC SERV: HCPCS

## 2024-07-30 PROCEDURE — 99223 1ST HOSP IP/OBS HIGH 75: CPT | Mod: AI | Performed by: REGISTERED NURSE

## 2024-07-30 PROCEDURE — 250N000013 HC RX MED GY IP 250 OP 250 PS 637: Performed by: REGISTERED NURSE

## 2024-07-30 PROCEDURE — 82465 ASSAY BLD/SERUM CHOLESTEROL: CPT | Performed by: REGISTERED NURSE

## 2024-07-30 PROCEDURE — 82306 VITAMIN D 25 HYDROXY: CPT | Performed by: REGISTERED NURSE

## 2024-07-30 PROCEDURE — 82977 ASSAY OF GGT: CPT | Performed by: REGISTERED NURSE

## 2024-07-30 PROCEDURE — 93005 ELECTROCARDIOGRAM TRACING: CPT

## 2024-07-30 PROCEDURE — 93010 ELECTROCARDIOGRAM REPORT: CPT | Mod: GC | Performed by: INTERNAL MEDICINE

## 2024-07-30 PROCEDURE — 82607 VITAMIN B-12: CPT | Performed by: REGISTERED NURSE

## 2024-07-30 PROCEDURE — 124N000002 HC R&B MH UMMC

## 2024-07-30 PROCEDURE — 80307 DRUG TEST PRSMV CHEM ANLYZR: CPT | Performed by: REGISTERED NURSE

## 2024-07-30 PROCEDURE — 82746 ASSAY OF FOLIC ACID SERUM: CPT | Performed by: REGISTERED NURSE

## 2024-07-30 PROCEDURE — 83036 HEMOGLOBIN GLYCOSYLATED A1C: CPT | Performed by: REGISTERED NURSE

## 2024-07-30 PROCEDURE — H2032 ACTIVITY THERAPY, PER 15 MIN: HCPCS | Performed by: PSYCHOLOGIST

## 2024-07-30 RX ORDER — POLYETHYLENE GLYCOL 3350 17 G
2 POWDER IN PACKET (EA) ORAL
Status: DISCONTINUED | OUTPATIENT
Start: 2024-07-30 | End: 2024-08-12 | Stop reason: HOSPADM

## 2024-07-30 RX ADMIN — ESCITALOPRAM OXALATE 10 MG: 5 TABLET, FILM COATED ORAL at 08:07

## 2024-07-30 RX ADMIN — OLANZAPINE 15 MG: 15 TABLET, FILM COATED ORAL at 20:42

## 2024-07-30 RX ADMIN — OLANZAPINE 5 MG: 5 TABLET, FILM COATED ORAL at 08:07

## 2024-07-30 RX ADMIN — PRAZOSIN HYDROCHLORIDE 1 MG: 1 CAPSULE ORAL at 20:43

## 2024-07-30 ASSESSMENT — ACTIVITIES OF DAILY LIVING (ADL)
ADLS_ACUITY_SCORE: 28
ORAL_HYGIENE: INDEPENDENT
ADLS_ACUITY_SCORE: 28
HYGIENE/GROOMING: INDEPENDENT
DRESS: INDEPENDENT
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
## 2024-07-30 NOTE — PLAN OF CARE
Goal Outcome Evaluation:                 Problem: Psychotic Signs/Symptoms  Goal: Increased Participation and Engagement (Psychotic Signs/Symptoms)  Outcome: Not Progressing      Pt has been visible and appropriate in milieu. Pt has been attending groups. During conversation with writer pt is guarded and dismissive. Pt does not acknowledge having a mental illness and believes he is here unnecessarily. Pt took all scheduled medication and allowed a blood draw and EKG to be completed. Pt is aware a urine specimen has been requested and confirms he will notify staff once able to provide the specimen.

## 2024-07-30 NOTE — PLAN OF CARE
Pt appeared to sleep 7 hours. No concerns reported. Safety checks done at least every 15 minutes.

## 2024-07-30 NOTE — PLAN OF CARE
Goal Outcome Evaluation:    Initial meeting note:    Therapist introduced self to patient and discussed psychotherapy service available to patient.     Pt response: Pt not interested currently in meeting 1:1; therapist will continue remaining available for pt     Plan: Pt was encouraged to attend groups and therapist will remain available for 1:1 sessions

## 2024-07-30 NOTE — PLAN OF CARE
BEH IP Unit Acuity Rating Score (UARS)  Patient is given one point for every criteria they meet.    CRITERIA SCORING   On a 72 hour hold, court hold, committed, stay of commitment, or revocation. 1    Patient LOS on BEH unit exceeds 20 days. 0  LOS: 1   Patient under guardianship, 55+, otherwise medically complex, or under age 11. 0   Suicide ideation without relief of precipitating factors. 0   Current plan for suicide. 0   Current plan for homicide. 0   Imminent risk or actual attempt to seriously harm another without relief of factors precipitating the attempt. 0   Severe dysfunction in daily living (ex: complete neglect for self care, extreme disruption in vegetative function, extreme deterioration in social interactions). 1   Recent (last 7 days) or current physical aggression in the ED or on unit. 0   Restraints or seclusion episode in past 72 hours. 0   Recent (last 7 days) or current verbal aggression, agitation, yelling, etc., while in the ED or unit. 1 4 days since 7/26   Active psychosis. 0   Need for constant or near constant redirection (from leaving, from others, etc).  0   Intrusive or disruptive behaviors. 0   Patient requires 3 or more hours of individualized nursing care per 8-hour shift (i.e. for ADLs, meds, therapeutic interventions). 0   TOTAL 3
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
## 2024-07-30 NOTE — PROGRESS NOTES
Rehab Group    Start time: 2000  End time: 2100  Patient time total: 30 minutes    attended partial group     #3 attended   Group Type: art   Group Topic Covered: mindfulness       Group Session Detail:  Art Therapy directive was to create art expressing a recent pleasant experience in which pt felt a positive emotion. Pts were then encouraged to create a Haiku poem to further express pleasant experience/positive emotion.  Goals of directive: emotional expression, emotional regulation, mindfulness, media exploration.     Patient Response/Contribution:  cooperative with task       Patient Detail:    Pt was an engaged participant, focused on drawing for the time pt attended group. Pt finished art and briefly shared with group. Pt created an ocean landscape and created poetry further expressing his art.  Pt was observed as guarded, tense affect.  Further assessment is needed.        Activity Therapy Per 15 minutes () Other Rehab therapies    Patient Active Problem List   Diagnosis    Unspecified mood (affective) disorder (H24)    Schizophrenia (H)    Bipolar I disorder, most recent episode (or current) manic (H)

## 2024-07-30 NOTE — PROGRESS NOTES
"Pt was admitted to the unit at 1835. He was seen at [facility] ED for increasing agitation and hallucinations as per his group home staff. Pt was calm and cooperative with the admission process. Down to gown performed. Vital signs taken. Pt was given a dinner tray and he ate dinner. Admission interview was completed. During the admission interview, pt was guarded and quick to deny all psych symptoms. When asked what happened that brought him here, pt stated, \"nothing.\" He went on to say that he should have never gone back to his group home after he left and only did so because he had no where else to stay and did not know that he needed a cosigner get a lease. He asked about how long he would be here and it was explained that he is on a 72 hour hold with a petition for commitment in place. Pt oriented to the unit and shown to his room.    "

## 2024-07-30 NOTE — CARE PLAN
Rehab Group    Start time: 1315  End time: 1400  Patient time total: 45 minutes    attended full group    #4 attended   Group Type: occupational therapy   Group Topic Covered: balanced lifestyle, cognitive activities, educational support, healthy leisure time, substance use/recovery , and social skills       Group Session Detail:    Education provided on the benefits of healthy leisure participation for mental health and the relationship between boredom and substance use relapse with hands on Miira game for cognitive processing, concentration, tracking, flexible thinking skills, follow through, frustration tolerance, coping, mood stabilization, reality-based activity, an opportunity to experience success, and socialization.     Patient Response/Contribution:  required prompts or assistance to participate and distracted        Patient Detail:  Pt was pleasant and engaged in group, though often distracted and needed reminders to take his turn.  He was avoidant of eye contact, though more open with peers than staff.  Pt also became a bit sleepy by the end of the group, though he remained and made an effort to be awake and participate.  Pt's fund of knowledge was adequate, though limited in a few areas posed.            Train & Education Service Per Session 45 + Minutes () OT Group code    Patient Active Problem List   Diagnosis    Unspecified mood (affective) disorder (H24)    Schizophrenia (H)    Bipolar I disorder, most recent episode (or current) manic (H)

## 2024-07-30 NOTE — PLAN OF CARE
Upcoming Meetings and Dates/Important Information  Days since IP admission 1  Team Note Due Tuesday  No future appointments.     Next Steps      Assessment/Intervention/Current Symptoms and Care Coordination  Chart Review  Met with team to discuss patient care.  Called [facility], pt is assigned to Afshan Soto for Screening [phone]. Spoke with Afshan and she said decision would not be ready today.  Afshan spoke with pt via phone.  Emailed Afshan pt's updated 72HH  Pt signed ISELA for [facility] and Teofilo ADAM  Called Santos at pt's group home [facility] and informed him that he was admitted.  Spoke with Afshan who stated the prepetition screening are in support of petition.  Left voicemail message with CADI  informing him that pt was admitted and that I am his assigned .  Informed pt that the [facility] did support the petition. Explained that I expect this to go faster than the last time he was committed and that I would like to set a goal of stabilizing him and discharging him before his CADI waiver closes.      Discharge Plan or Goal  Dispo Plan: TBD  Transportation: TBD  Medication: TBD    Provisional discharge: TBD  Safety plan complete?: Not yet completed  Appointments:   TBD     Barriers to Discharge  patient requires further psychiatric stabilization due to current symptomology, medication management with possible adjustments, commitment petition in process       Expected Discharge Date: 07/31/2024                  Referral Status  No new referral made    Legal Status  72 hour hold expiring 7/30/2024 2120  [facility] Commitment  Petition with [employer] meds requested Haldol, Risperdal, Zyprexa, Abilify    Contacts  Primary Care Provider:  No assigned PCP with [facility]  [address] [place] [address] [phone]     Medication Management/Psychiatry:   Maci Lisa, APRN, CNP, PMHNP-BC with [facility]  [address] [place] [address]  Phone [phone] Fax [phone]     :   Santos Smith () [phone]   [facility]  [address] [place] [address]  [email or web address]     CADI :   Teofilo Justin ([phone])   [employer]     Optometry 08/26/2024 9:20 AM  Reyna Calderon, OD with [facility]   [address] [place] [address]   [phone]       [facility] - [place] [place] - [address]  P: [phone] F: [phone]

## 2024-07-30 NOTE — PLAN OF CARE
"Goal Outcome Evaluation:    Plan of Care Reviewed With: patient      Problem: Psychotic Signs/Symptoms  Goal: Improved Behavioral Control (Psychotic Signs/Symptoms)  Outcome: Progressing  Note: Pt presented with a flat, restless affect. He showered, attended group and paced the loaiza during the shift. He asked for his shoes and was informed he would have to wait and talk with his provider about getting them tomorrow. He was provided with the unit supplied sandals. When he was pacing in the loaiza, PA heard him say to himself, \"I can't believe I'm here because I didn't have sex with the group home staff. What the fuck!\" He was placed on sexual precautions.       Temp: 98.8  F (37.1  C) Temp src: Oral BP: (!) 148/84 Pulse: 70     SpO2: 98 % O2 Device: None (Room air)      "

## 2024-07-30 NOTE — CARE PLAN
Rehab Group    Start time: 1015  End time: 1200  Patient time total: 80 minutes    attended full group    #6 attended   Group Type: occupational therapy   Group Topic Covered: balanced lifestyle, coping skills, healthy leisure time, problem solving, self-esteem, and social skills       Group Session Detail:   Hands on healthy leisure exploration with F[employer] activity for concentration, fine motor skills, creative expression, organization/planning, follow through, calming through repetition, coping, reality-based activity, mood stabilization, building self-esteem, and an opportunity for socialization.        Patient Response/Contribution:  actively engaged and distracted        Patient Detail:  Pt was pleasant with good manners.  He began work on a medium sized project and worked on it very slowly.  He was often distracted, and at other times social with peers.  Pt did create a pattern, though did only the outline of his project, cutting out a lot of the work.  Pt was pulled out of group by another staff member, though he did return.  He was in and out of group a few times aside from this.  Pt cleaned up his workspace with no need for a prompt and verbalized that he was pleased with the outcome of his work.            Train & Education Service Per Session 45 + Minutes () OT Group code    Patient Active Problem List   Diagnosis    Unspecified mood (affective) disorder (H24)    Schizophrenia (H)    Bipolar I disorder, most recent episode (or current) manic (H)

## 2024-07-30 NOTE — PHARMACY-ADMISSION MEDICATION HISTORY
Medication history completed on 7/26/2024 at [facility]. Please refer to this note for prior to admission medication information.    LALITA BOO, [facility]  [phone] and/or available on [employer]

## 2024-07-31 PROCEDURE — 250N000013 HC RX MED GY IP 250 OP 250 PS 637: Performed by: REGISTERED NURSE

## 2024-07-31 PROCEDURE — H2032 ACTIVITY THERAPY, PER 15 MIN: HCPCS | Performed by: PSYCHOLOGIST

## 2024-07-31 PROCEDURE — 124N000002 HC R&B MH UMMC

## 2024-07-31 PROCEDURE — 99232 SBSQ HOSP IP/OBS MODERATE 35: CPT | Performed by: REGISTERED NURSE

## 2024-07-31 PROCEDURE — 97150 GROUP THERAPEUTIC PROCEDURES: CPT | Mod: GO

## 2024-07-31 RX ORDER — CLONIDINE HYDROCHLORIDE 0.1 MG/1
0.1 TABLET ORAL 2 TIMES DAILY PRN
Status: DISCONTINUED | OUTPATIENT
Start: 2024-07-31 | End: 2024-08-12 | Stop reason: HOSPADM

## 2024-07-31 RX ADMIN — NICOTINE POLACRILEX 2 MG: 2 LOZENGE ORAL at 16:50

## 2024-07-31 RX ADMIN — OLANZAPINE 5 MG: 5 TABLET, FILM COATED ORAL at 08:43

## 2024-07-31 RX ADMIN — PRAZOSIN HYDROCHLORIDE 1 MG: 1 CAPSULE ORAL at 21:11

## 2024-07-31 RX ADMIN — OLANZAPINE 15 MG: 15 TABLET, FILM COATED ORAL at 21:11

## 2024-07-31 RX ADMIN — ESCITALOPRAM OXALATE 10 MG: 5 TABLET, FILM COATED ORAL at 08:43

## 2024-07-31 ASSESSMENT — ACTIVITIES OF DAILY LIVING (ADL)
ADLS_ACUITY_SCORE: 28
LAUNDRY: WITH SUPERVISION
ADLS_ACUITY_SCORE: 28
HYGIENE/GROOMING: INDEPENDENT
ADLS_ACUITY_SCORE: 28
ADLS_ACUITY_SCORE: 28
ORAL_HYGIENE: INDEPENDENT
ADLS_ACUITY_SCORE: 28
DRESS: INDEPENDENT
ADLS_ACUITY_SCORE: 28

## 2024-07-31 NOTE — PLAN OF CARE
BEH IP Unit Acuity Rating Score (UARS)  Patient is given one point for every criteria they meet.    CRITERIA SCORING   On a 72 hour hold, court hold, committed, stay of commitment, or revocation. 1    Patient LOS on BEH unit exceeds 20 days. 0  LOS: 2   Patient under guardianship, 55+, otherwise medically complex, or under age 11. 0   Suicide ideation without relief of precipitating factors. 0   Current plan for suicide. 0   Current plan for homicide. 0   Imminent risk or actual attempt to seriously harm another without relief of factors precipitating the attempt. 0   Severe dysfunction in daily living (ex: complete neglect for self care, extreme disruption in vegetative function, extreme deterioration in social interactions). 1   Recent (last 7 days) or current physical aggression in the ED or on unit. 0   Restraints or seclusion episode in past 72 hours. 0   Recent (last 7 days) or current verbal aggression, agitation, yelling, etc., while in the ED or unit. 1 5 days since 7/26   Active psychosis. 0   Need for constant or near constant redirection (from leaving, from others, etc).  0   Intrusive or disruptive behaviors. 0   Patient requires 3 or more hours of individualized nursing care per 8-hour shift (i.e. for ADLs, meds, therapeutic interventions). 0   TOTAL 3

## 2024-07-31 NOTE — PROGRESS NOTES
Rehab Group    Start time: 2000  End time: 2050  Patient time total: 30 minutes    attended partial group     #3 attended   Group Type: recreation   Group Topic Covered: healthy leisure time       Group Session Detail:  TR leisure group     Patient Response/Contribution:  cooperative with task, attentive, and actively engaged       Patient Detail:    Pt participated in Therapeutic Recreation group with focus on leisure participation, communication skills, and critical thinking.  Pt entered group late, but was engaged and focused in the group recreational activity via a group game.  Pt was able to join into the activity easily and was able to add some clues and helpful hints to help out others during their turns. Pt was sociable and active in the game, but seemed to get distracted easily.         Activity Therapy Per 15 minutes () Other Rehab therapies    Patient Active Problem List   Diagnosis    Unspecified mood (affective) disorder (H24)    Schizophrenia (H)    Bipolar I disorder, most recent episode (or current) manic (H)

## 2024-07-31 NOTE — PLAN OF CARE
Problem: Psychotic Signs/Symptoms  Goal: Improved Behavioral Control (Psychotic Signs/Symptoms)  Outcome: Progressing   Goal Outcome Evaluation:    Plan of Care Reviewed With: patient        Pt has been visible pacing halls but mostly withdrawing to self. During conversation with writer pt is quick to deny any and all symptoms saying he is here after a miscommunication at his group home. Pt reports engaging in aerobics in a small space and the staff at the home accusing him of destroying property. Pt believes he is well enough to be discharged and wishes to return to his group home. Pt took all scheduled medication and did not require as needed medications.

## 2024-07-31 NOTE — PLAN OF CARE
"Rehab Group     Start time: 1015  End time: 1200  Patient time total: 70 minutes     came in and out of group session     #5 attended   Group Type: occupational therapy   Group Topic Covered: cognitive activities, coping skills, healthy leisure time, and problem solving   Group Session Detail: OT: Education on healthy leisure engagement with creative hands-on endeavor (beadie animal) to increase following directions, sequencing, concentration, focus, attention to task/detail, task follow through, problem solving, improving feelings of self-worth, planning, healthy leisure engagement, healthy distraction engagement, and coping with stress    Patient Response/Contribution: cooperative with task, listened actively, semi-disruptive   Patient Detail: Pt sat among peers for check-in and education regarding OT role on the unit. Pt engaged in semi-disruptive reciprocal social interactions with peers; pt redirectable. Pt reported during check-in that \"eating more\" is something that is going well for him recently. Pt IND gathered supplies. Pt able to IND determine directions for task by observing and studying a visual model; pt confirmed understanding with therapist. Pt attended to task for duration of group. Pt able to follow pattern exactly for most of the project, but project appeared to become slightly disorganized at the end (he was not longer following the pattern and tied his finishing know in messy manner. Pt completed project and left group early; pt did not return.        [id number] OT Group (2 or more in attendance)    Patient Active Problem List   Diagnosis    Unspecified mood (affective) disorder (H24)    Schizophrenia (H)    Bipolar I disorder, most recent episode (or current) manic (H)         "

## 2024-07-31 NOTE — PLAN OF CARE
"Goal Outcome Evaluation:    Plan of Care Reviewed With: patient      Pt presents with a labile mood, observed to be pacing the hallways. Patient reports that he knows why he was in the hospital but was not ready to talk about it. Patient declined mental illness check in. Patient was able to provide the urine sample for UDS and was sent to the lab during shift. Patient was compliant with scheduled medication requested and was given the medication earlier since he wanted to lay down and sleep. Patient did not use any prn during shift, eats and drinks okay. Continue with plan of care.  Blood pressure (!) 150/73, pulse 67, temperature 98.6  F (37  C), temperature source Oral, resp. rate 16, height 1.676 m (5' 6\"), weight 58.4 kg (128 lb 11.2 oz), SpO2 98%.        "

## 2024-07-31 NOTE — PLAN OF CARE
Upcoming Meetings and Dates/Important Information  Days since IP admission 2  Team Note Due Tuesday  No future appointments.     Next Steps      Assessment/Intervention/Current Symptoms and Care Coordination  Chart Review  Met with team to discuss patient care.  MCRO check at 2:17 PM indicates petition has not been filed yet.    Discharge Plan or Goal  Dispo Plan: TBD  Transportation: TBD  Medication: TBD    Provisional discharge: TBD  Safety plan complete?: Not yet completed  Appointments:   TBD     Barriers to Discharge  patient requires further psychiatric stabilization due to current symptomology, medication management with possible adjustments, commitment petition in process       Expected Discharge Date: 08/09/2024        Discharge Comments: Petition for Commitment          Referral Status  No new referral made    Legal Status  72 hour hold expiring 7/30/2024 2120  [facility] Commitment  Petition with [employer] meds requested Haldol, Risperdal, Zyprexa, Abilify    Contacts  Primary Care Provider:  No assigned PCP with [facility]  [address] [place] [address] [phone]     Medication Management/Psychiatry:   Maci Lisa, APRN, CNP, PMHNP-BC with [facility] [address], [facility] [address] [place] [address]  Phone [phone] Fax [phone]     :   Santos Smith () [phone]   [facility]  [address] [place] [address]  [email or web address] - [place]     CADI :   Teofilo Justin ([phone])   [facility]     Optometry 08/26/2024 9:20 AM  Reyna Calderon, GERALDO with [facility]   [address] [place] [address]   [phone]       Pharmacy   [facility] [place] [address]  P: [phone] F: [phone]

## 2024-07-31 NOTE — PROGRESS NOTES
"[facility], [place]   Psychiatric Progress Note  Hospital Day: 2    Interim History:   The patient's care was discussed with the treatment team and chart notes were reviewed.    Vitals: VSS  Sleep: 7 hours (07/31/24 0600)  Scheduled medications: Took all scheduled medications as prescribed  Psychiatric PRN medications: No psychiatric prns given    Staff Report:   No acute events or safety concerns overnight.  In summary, patient was visible in the therapeutic milieu.  He attended groups.  He appeared guarded and dismissive of staff assessment questions.  He was pleasant and engaged during groups.  However, he was easily distracted.  Per staff documentation, patient slept 7 hours last night.  Yesterday, patient did not take any PRN medications.  Please see staff notes for details.     Upon interview, the patient reports his mood is \"good\".  He denies auditory hallucinations, visual hallucinations, or paranoia.  He states he has not spoken to anyone at the group home.  However, patient would like to return to group home after discharge.  Patient reports groups have been \"good\".   He is eating and sleeping adequately.  He denies difficulty initiating sleep or nighttime awakenings.  Patient denies irritability, anxiety, suicidal ideation or homicidal ideation.  He reports feeling \"okay\" in the hospital.  Patient denies previous issues with blood pressure.  He denies dizziness or headaches with recent elevated blood pressure readings.  Provider consulted with internal medicine; they recommend patient follow-up with outpatient PCP after discharge.  As needed clonidine ordered for blood pressures greater than 160/100.  Patient is tolerating medications well, and denies side effects.   Although patient denies auditory hallucinations, he appears to be responding to internal stimuli on exam.    Suicidal ideation: denies current or recent suicidal ideation or behaviors.    Homicidal ideation: " denies current or recent homicidal ideation or behaviors.    Psychotic symptoms:  Although patient denies AH, VH, paranoia, and delusions, he appears to be internally occupied on exam.     Medication side effects reported:  No significant side effects.    Acute medical concerns:  BPs have been fairly consistently elevated. Most recent readings 150/91, 150/73, 148/91, 148/84, 132/74, 132/71, 175/102.  PRN clonidine ordered BID for BPs >160/100.  Provider contacted IM for input regarding need for regularly scheduled antihypertensive.  They recommend patient follow-up with primary care provider after discharge.    Other issues reported by patient:  Patient had no further questions or concerns.      Diagnoses:   Primary Diagnosis:  Schizophrenia, decompensated     Secondary Diagnoses:   Cannabis use disorder, per chart  Tobacco use, r/o disorder  Trauma, r/o PTSD     Clinically Significant Risk Factors        # Financial/Environmental Concerns:            Hospital Course:   Plan on Admission:  Admit to [address]  72 Hour Hold   Continue PTA Zyprexa, Lexapro, and prazosin    Start PRN hydroxyzine, PRN Zyprexa, and PRN trazodone  Start Safety precautions:  suicide, cheeking, elopement, sexual, assault   Additional labs:  UDS, CBC, CMP, TSH, lipids, HgbA1c, B12/folate, vitamin D  EKG    7/31:  Patient is guarded on exam.  He is somewhat dismissive of assessment questions and denies psychiatric symptoms.  Patient is internally preoccupied and appears to be responding to internal stimuli.  He denies side effects from medications.  He agrees to return to group home after discharge.    Plan:   Today's Changes:  - No medication changes today.  Continue plan of care.    Target psychiatric symptoms and interventions:  # Psychosis   - Continue Zyprexa 5mg AM and 15mg HS     # Mood  - Continue Lexapro 10mg AM     # Anxiety   - Continue hydroxyzine 25mg q4h PRN for acute anxiety     # Insomnia  - Continue Trazodone 50mg at  bedtime PRN for sleep disturbances  - Continue prazosin 1mg HS     # Agitation  - Continue Zyprexa 10mg TID PRN for severe agitation     # Tobacco use  - Start nicotine lozenge 2mg every 1 hour as needed      Risks, benefits, and alternatives discussed at length with patient.       Acute Medical Problems and Treatments:  Acute medical concerns:  - No acute medical concerns    # High BP readings  - BPs have been fairly consistently elevated. Most recent readings 150/91, 150/73, 148/91, 148/84, 132/74, 132/71, 175/102.    - PRN clonidine ordered BID for BPs >160/100.    - IM recommend patient follow-up with PCP after discharge    Pertinent labs/imaging:  - 7/30:  UDS, CMP, CBC, TSH, lipids, vitamin B12, and vitamin D all within normal limits.    Behavioral/Psychological/Social:  - Encourage unit programming    Safety:  - Continue precautions as noted above  - Status 15 minute checks    Legal Status: 72 hour hold  - 72 Hour Hold - Date/Time Initiated: 07/25/24 2119; Ends: 07/30/24 2119  - 2nd Hour Hold - Date/Time Initiated:  07/30/24 1016  - Petition for Commitment and [place] submitted through [facility]    Disposition Plan   Reason for ongoing admission:   - poses an imminent risk to others and is unable to care for self due to severe psychosis or yojana  Discharge location:   - group home  Discharge Medications:   - not ordered  - Will be ordered PTA  Follow-up Appointments:   - Patient will need follow-up appointments with outpatient psychiatry  Estimated Length of Stay:   - 2 -3 weeks pending commitment process and improvement in symptoms     Remain on inpatient unit until psychiatric symptoms have stabilized and patient is safe for discharge.     Entered by: NARAYAN Sales CNP on 07/31/2024  at 8:35 AM       Psychiatric Examination:   Mental Status Exam:  Appearance: awake, alert, adequately groomed, dressed in hospital scrubs, and appeared as age stated  Attitude:  evasive and guarded  Eye  Contact:  poor   Mood:  good  Affect:  intensity is flat and guarded  Speech:  paucity of speech, increased latency  Language:  fluent and intact in English  Psychomotor, Gait, Musculoskeletal:  no evidence of tardive dyskinesia, dystonia, or tics and intact station, gait and muscle tone  Thought Process:  disorganized and evidence of thought blocking present  Associations:  no loose associations  Thought Content:  no evidence of suicidal ideation or homicidal ideation and patient appears to be responding to internal stimuli  Insight:  poor  Judgement: poor   Oriented to: grossly oriented  Attention Span and Concentration:  distracted, limited attention  Recent and Remote Memory:  adequate for interview  Fund of Knowledge:  appears to be normal      Medications:     Current Facility-Administered Medications   Medication Dose Route Frequency Provider Last Rate Last Admin    escitalopram (LEXAPRO) tablet 10 mg  10 mg Oral Daily Johanna Zarate APRN CNP   10 mg at 07/30/24 0807    OLANZapine (zyPREXA) tablet 15 mg  15 mg Oral At Bedtime Johanna Zarate APRN CNP   15 mg at 07/30/24 2042    OLANZapine (zyPREXA) tablet 5 mg  5 mg Oral QAM Johanna Zarate APRN CNP   5 mg at 07/30/24 0807    prazosin (MINIPRESS) capsule 1 mg  1 mg Oral At Bedtime Johanna Zarate APRN CNP   1 mg at 07/30/24 2043          Precautions:     Behavioral Orders   Procedures    Assault precautions    Cheeking Precautions (behavioral units)    Code 1 - Restrict to Unit    Elopement precautions    Petition for commitment    Routine Programming     As clinically indicated    Sexual precautions    Status 15     Every 15 minutes.    Suicide precautions: Suicide Risk: LOW; Clinical rationale to override score: modification to the care environment     Patients on Suicide Precautions should have a Combination Diet ordered that includes a Diet selection(s) AND a Behavioral Tray selection for Safe Tray - with utensils, or Safe  "Tray - NO utensils       Order Specific Question:   Suicide Risk     Answer:   LOW     Order Specific Question:   Clinical rationale to override score:     Answer:   modification to the care environment         Allergies:     No Known Allergies      Labs:     Recent Results (from the past 24 hour(s))   EKG 12-lead, tracing only    Collection Time: 07/30/24 11:49 AM   Result Value Ref Range    Systolic Blood Pressure  mmHg    Diastolic Blood Pressure  mmHg    Ventricular Rate 59 BPM    Atrial Rate 59 BPM    WA Interval 128 ms    QRS Duration 84 ms     ms    QTc 384 ms    P Axis 50 degrees    R AXIS 91 degrees    T Axis 34 degrees    Interpretation ECG       Sinus bradycardia  Rightward axis  Borderline ECG  No previous ECGs available     Urine Drug Screen Panel    Collection Time: 07/30/24  5:07 PM   Result Value Ref Range    Amphetamines Urine Screen Negative Screen Negative    Barbituates Urine Screen Negative Screen Negative    Benzodiazepine Urine Screen Negative Screen Negative    Cannabinoids Urine Screen Negative Screen Negative    Cocaine Urine Screen Negative Screen Negative    Fentanyl Qual Urine Screen Negative Screen Negative    Opiates Urine Screen Negative Screen Negative    PCP Urine Screen Negative Screen Negative         BP (!) 150/73   Pulse 67   Temp 98.6  F (37  C) (Oral)   Resp 16   Ht 1.676 m (5' 6\")   Wt 58.4 kg (128 lb 11.2 oz)   SpO2 98%   BMI 20.77 kg/m      Weight is 128 lbs 11.2 oz  Body mass index is 20.77 kg/m .    Weight over time:  Vitals:    07/29/24 1836   Weight: 58.4 kg (128 lb 11.2 oz)       Orthostatic Vitals         Most Recent      Sitting Orthostatic /93  Comment: Provider Notified 07/30 0849    Sitting Orthostatic Pulse (bpm) 77 07/30 0849    Standing Orthostatic /99  Comment: Provider notified 07/30 0849    Standing Orthostatic Pulse (bpm) 103 07/30 0849              Cardiometabolic risk assessment. 07/31/24    Reviewed patient profile for " cardiometabolic risk factors      Date taken /Value  REFERENCE RANGE   Abdominal Obesity  (Waist Circumference)   See nursing flowsheet Women ?35 in (88 cm)   Men ?40 in (102 cm)      Triglycerides  Triglycerides   Date Value Ref Range Status   07/30/2024 58 <150 mg/dL Final       ?150 mg/dL (1.7 mmol/L) or current treatment for elevated triglycerides   HDL cholesterol  Direct Measure HDL   Date Value Ref Range Status   07/30/2024 41 >=40 mg/dL Final   ]   Women <50 mg/dL (1.3 mmol/L) in women or current treatment for low HDL cholesterol  Men <40 mg/dL (1 mmol/L) in men or current treatment for low HDL cholesterol     Fasting plasma glucose (FPG) Lab Results   Component Value Date    GLC 88 07/30/2024      FPG ?100 mg/dL (5.6 mmol/L) or treatment for elevated blood glucose   Blood pressure  BP Readings from Last 3 Encounters:   07/30/24 (!) 150/73   07/29/24 132/74   03/26/24 131/74    Blood pressure ?130/85 mmHg or treatment for elevated blood pressure   Family History  See family history          Consults:         Entered by: NARAYAN Sales CNP on 07/31/2024  at 8:35 AM       Certain aspects of this note may be copied or templated. Content is updated and changed where needed to reflect the most recent assessment and plan of care. This document is created with the help of Dragon dictation system.  All grammatical/typing errors or context distortion are unintentional and inherent to software.     Attestation:  Patient has been seen and evaluated by me, NARAYAN Sales, CNP.  I spent >35 min on the date of the encounter in chart review, patient visit, review of tests, documentation, care coordination, and/or discussion with other providers about the issues documented above.

## 2024-08-01 LAB
ATRIAL RATE - MUSE: 59 BPM
DIASTOLIC BLOOD PRESSURE - MUSE: NORMAL MMHG
INTERPRETATION ECG - MUSE: NORMAL
P AXIS - MUSE: 50 DEGREES
PR INTERVAL - MUSE: 128 MS
QRS DURATION - MUSE: 84 MS
QT - MUSE: 388 MS
QTC - MUSE: 384 MS
R AXIS - MUSE: 91 DEGREES
SYSTOLIC BLOOD PRESSURE - MUSE: NORMAL MMHG
T AXIS - MUSE: 34 DEGREES
VENTRICULAR RATE- MUSE: 59 BPM

## 2024-08-01 PROCEDURE — 124N000002 HC R&B MH UMMC

## 2024-08-01 PROCEDURE — 97150 GROUP THERAPEUTIC PROCEDURES: CPT | Mod: GO

## 2024-08-01 PROCEDURE — 250N000013 HC RX MED GY IP 250 OP 250 PS 637: Performed by: REGISTERED NURSE

## 2024-08-01 PROCEDURE — 99232 SBSQ HOSP IP/OBS MODERATE 35: CPT | Performed by: REGISTERED NURSE

## 2024-08-01 RX ADMIN — PRAZOSIN HYDROCHLORIDE 1 MG: 1 CAPSULE ORAL at 19:32

## 2024-08-01 RX ADMIN — ESCITALOPRAM OXALATE 10 MG: 5 TABLET, FILM COATED ORAL at 08:17

## 2024-08-01 RX ADMIN — OLANZAPINE 5 MG: 5 TABLET, FILM COATED ORAL at 08:17

## 2024-08-01 RX ADMIN — OLANZAPINE 15 MG: 15 TABLET, FILM COATED ORAL at 19:32

## 2024-08-01 ASSESSMENT — ACTIVITIES OF DAILY LIVING (ADL)
HYGIENE/GROOMING: INDEPENDENT
ADLS_ACUITY_SCORE: 28
DRESS: INDEPENDENT
ADLS_ACUITY_SCORE: 28
HYGIENE/GROOMING: INDEPENDENT
ADLS_ACUITY_SCORE: 28
ORAL_HYGIENE: INDEPENDENT
ADLS_ACUITY_SCORE: 28
ORAL_HYGIENE: INDEPENDENT
ADLS_ACUITY_SCORE: 28
ADLS_ACUITY_SCORE: 28
DRESS: INDEPENDENT

## 2024-08-01 NOTE — PROGRESS NOTES
Rehab Group    Start time: 2000  End time: 2100  Patient time total: 60 minutes    attended full group    #4 attended   Group Type: art   Group Topic Covered: cognitive therapy       Group Session Detail:  Pts were encouraged to choose a positive affirmation from a handout of self compassion affirmations and to create art using the chose affirmation.      Patient Response/Contribution:  cooperative with task       Patient Detail:    Pt was an engaged participant, focused on task for the full duration of group. Pt's comments in group were off-topic at times about issues such environmental pollution/climate related issues. Pt created a small collage with a positive affirmation and shared with author and group. Pt helps author clean up at the end of each group. Pt was polite, calm participant.      Activity Therapy Per 15 min ()      Patient Active Problem List   Diagnosis    Unspecified mood (affective) disorder (H24)    Schizophrenia (H)    Bipolar I disorder, most recent episode (or current) manic (H)

## 2024-08-01 NOTE — PLAN OF CARE
"Problem: Psychotic Signs/Symptoms  Goal: Improved Mood Symptoms (Psychotic Signs/Symptoms)  Outcome: Progressing     Goal Outcome Evaluation:    Plan of Care Reviewed With: patient      Pt was resting in room at start of evening. Pt reports doing well and denies any mental health symptoms, pleasant in conversation. Denied any concerns this evening. Pt's BP was 152/81 but was 136/85 when rechecked. Pt is quite animated while listening to music and laughing loudly at times with staff. Social with peers on unit. Pt attended art therapy group. Took bedtime medication without incident. RN explained indication of Prazosin when pt asked.     /85   Pulse 73   Temp 98.7  F (37.1  C) (Temporal)   Resp 16   Ht 1.676 m (5' 6\")   Wt 58.4 kg (128 lb 11.2 oz)   SpO2 100%   BMI 20.77 kg/m                 "

## 2024-08-01 NOTE — PLAN OF CARE
"Goal Outcome Evaluation:    Plan of Care Reviewed With: patient        Pt answers my questions but  offers little conversation saying he is \"doing great\" and \"feels real good\". Denies anxiety, depression or SI. Pt now has an order for double portions as he did not feel he was getting enough to eat.     Some intermittent pacing noted, appears restless at times but pt denies concerns. Pt is attending groups.    Bp 149/88 this am. Will monitor.           "

## 2024-08-01 NOTE — PROGRESS NOTES
At 1620,  received a phone call from [facility] court informing the unit that patient was put on a court hold at 1618 today, August 1st, 2024. Unit fax number was given per request.

## 2024-08-01 NOTE — PROGRESS NOTES
CLINICAL NUTRITION SERVICES - BRIEF NOTE     Nutrition Prescription    Recommendations already ordered by Registered Dietitian (RD):  -double portions    Future/Additional Recommendations:  RD to sign off at this time, but will remain available by consult if new nutrition problem arises.       REASON FOR ASSESSMENT  Jw Broderick is a/an 24 year old male assessed by the dietitian for Provider Order - Please assess for appropriateness to receive double portions     Findings  Per chart review, pt is eating and drinking adequately, labs WNL. No concerns regarding order for double portions at this time.     INTERVENTIONS  Implementation  Nutrition Education: RD role in care   Modify composition of meals/snacks      Follow up/Monitoring  RD to sign off at this time, but will remain available by consult if new nutrition problem arises.      Adriana Son, MPH, RDN, LD  Behavioral Health Adult & Pediatric Dietitian  BEH Clinical Dietitian [place], Teams, or Desk: [phone]  Weekend/Holiday Vocera: Weekend Holiday Clinical Dietitian [Multi Site Groups]

## 2024-08-01 NOTE — PLAN OF CARE
"  Rehab Group    Start time: 1115  End time: 1215  Patient time total: 60 minutes    attended full group    #4 attended   Group Type: occupational therapy   Group Topic Covered: cognitive activities, coping skills, and healthy leisure time   Group Session Detail: OT: Education on 8 Dimensions of Wellness and interactive social activity (Wellness Bingo) to increase concentration, attention to task, visual scanning skills, symptom management, coping with stress, sharing information about self, listening to others, insight development, physical wellness, social wellness, and overall well-being   Patient Response/Contribution:  cooperative with task and actively engaged   Patient Detail: Pt reported during check-in that \"eating, staying active, and resting\" are activities he does regularly to support his overall wellness. Pt sat among peers for presented activity and engaged in brief social interactions with peers. Pt engaged in all physical movement activities, answered all questions about himself, and IND answered all cognitive questions. Pt reported they enjoyed the activity and verbalized understanding of importance of physical, social, and intellectual wellness in a healthy lifestyle.       [address] OT Group (2 or more in attendance)    Patient Active Problem List   Diagnosis    Unspecified mood (affective) disorder (H24)    Schizophrenia (H)    Bipolar I disorder, most recent episode (or current) manic (H)         "

## 2024-08-01 NOTE — PLAN OF CARE
Pt appeared to sleep for a total of 5.5 hours overnight. No PRN medications were administered, and no concerns were noted.     Problem: Sleep Disturbance  Goal: Adequate Sleep/Rest  Outcome: Progressing

## 2024-08-01 NOTE — PROGRESS NOTES
[facility], [place]   Psychiatric Progress Note  Hospital Day: 3    Interim History:   The patient's care was discussed with the treatment team and chart notes were reviewed.    Vitals: VSS  Sleep: 5.5 hours (08/01/24 0600)  Scheduled medications: Took all scheduled medications as prescribed  Psychiatric PRN medications: Nicotine gum x 1    Staff Report:   No acute events or safety concerns overnight.  Patient attended groups.  He was somewhat disruptive, but redirectable.  He was slightly disorganized and messy while working on group project.  He made comments that were off topic.  Patient reported he was eating more.  Patient spent time pacing in the hallway.  He denied psychiatric symptoms.  He told nursing staff he was well enough to be discharged.  Patient was noted to be social and animated.  Patient blood pressures continue to be intermittently elevated.  Per staff documentation, patient slept 5.5 hours last night.  Please see staff notes for details.     Today, the patient reports his mood is good.  He reports restful and adequate sleep.  His appetite has been good.  Patient denies hallucinations, depression, anxiety, or paranoia.  He denies pain, constipation, or other physical complaints.  He denies side effects from medications.  Patient reports groups are entertaining.  Patient requests to speak to the dietitian about ordering double portions.  Patient asks about discharge.  Provider explains petition for commitment and pending meetings with the court.      Suicidal ideation: denies current or recent suicidal ideation or behaviors.    Homicidal ideation: denies current or recent homicidal ideation or behaviors.    Psychotic symptoms:  Although patient denies AH, VH, paranoia, and delusions, he appears to be internally occupied on exam.     Medication side effects reported:  No significant side effects.    Acute medical concerns:   recommends patient follow-up with primary  care provider after discharge regarding elevated BPs.    Other issues reported by patient:  Patient had no further questions or concerns.      Diagnoses:   Primary Diagnosis:  Schizophrenia, decompensated     Secondary Diagnoses:   Cannabis use disorder, per chart  Tobacco use, r/o disorder  Trauma, r/o PTSD     Clinically Significant Risk Factors        # Financial/Environmental Concerns:            Hospital Course:   Plan on Admission:  Admit to [address]  72 Hour Hold   Continue PTA Zyprexa, Lexapro, and prazosin    Start PRN hydroxyzine, PRN Zyprexa, and PRN trazodone  Start Safety precautions:  suicide, cheeking, elopement, sexual, assault   Additional labs:  UDS, CBC, CMP, TSH, lipids, HgbA1c, B12/folate, vitamin D  EKG    7/31:  Patient is guarded on exam.  He is somewhat dismissive of assessment questions and denies psychiatric symptoms.  Patient is internally preoccupied and appears to be responding to internal stimuli.  He denies side effects from medications.  He agrees to return to group home after discharge.  8/1:  Patient continues to minimize psychiatric symptoms.  He appears to be internally preoccupied on exam.  No medication changes today.      Plan:   Today's Changes:  - No medication changes today.  Continue plan of care.     Target psychiatric symptoms and interventions:  # Psychosis   - Continue Zyprexa 5mg AM and 15mg HS     # Mood  - Continue Lexapro 10mg AM     # Anxiety   - Continue hydroxyzine 25mg q4h PRN for acute anxiety     # Insomnia  - Continue Trazodone 50mg at bedtime PRN for sleep disturbances  - Continue prazosin 1mg HS     # Agitation  - Continue Zyprexa 10mg TID PRN for severe agitation     # Tobacco use  - Start nicotine lozenge 2mg every 1 hour as needed      Risks, benefits, and alternatives discussed at length with patient.     Acute Medical Problems and Treatments:  Acute medical concerns:    # High BP readings  - BPs have been fairly consistently elevated. Most  recent readings 150/91, 150/73, 148/91, 148/84, 132/74, 132/71, 175/102.    - PRN clonidine ordered BID for BPs >160/100.    - IM recommend patient follow-up with PCP after discharge    Pertinent labs/imaging:  - 7/30:  UDS, CMP, CBC, TSH, lipids, vitamin B12, and vitamin D all within normal limits.    Behavioral/Psychological/Social:  - Encourage unit programming    Safety:  - Continue precautions as noted above  - Status 15 minute checks    Legal Status: 72 hour hold  - 72 Hour Hold - Date/Time Initiated: 07/25/24 2119; Ends: 07/30/24 2119  - 2nd Hour Hold - Date/Time Initiated:  07/30/24 1016  - Petition for Commitment and Tate submitted through [facility]    Disposition Plan   Reason for ongoing admission:   - poses an imminent risk to others and is unable to care for self due to severe psychosis or yojana  Discharge location:   - group home  Discharge Medications:   - not ordered  - Will be ordered PTA  Follow-up Appointments:   - Patient will need follow-up appointments with outpatient psychiatry  Estimated Length of Stay:   - 2 -3 weeks pending commitment process and improvement in symptoms     Remain on inpatient unit until psychiatric symptoms have stabilized and patient is safe for discharge.     Entered by: NARAYAN Sales CNP on 08/01/2024  at 8:02 AM       Psychiatric Examination:   Mental Status Exam:  Appearance: awake, alert, adequately groomed, dressed in hospital scrubs, and appeared as age stated  Attitude:  evasive and guarded  Eye Contact:  poor   Mood:  good  Affect:  intensity is flat and guarded  Speech:  paucity of speech, increased latency  Language:  fluent and intact in English  Psychomotor, Gait, Musculoskeletal:  no evidence of tardive dyskinesia, dystonia, or tics and intact station, gait and muscle tone  Thought Process:  disorganized and evidence of thought blocking present  Associations:  no loose associations  Thought Content:  no evidence of suicidal ideation or  "homicidal ideation and patient appears to be responding to internal stimuli  Insight:  poor  Judgement: poor   Oriented to: grossly oriented  Attention Span and Concentration:  distracted, limited attention  Recent and Remote Memory:  adequate for interview  Fund of Knowledge:  appears to be normal      Medications:     Current Facility-Administered Medications   Medication Dose Route Frequency Provider Last Rate Last Admin    escitalopram (LEXAPRO) tablet 10 mg  10 mg Oral Daily Johanna Zarate APRN CNP   10 mg at 07/31/24 0843    OLANZapine (zyPREXA) tablet 15 mg  15 mg Oral At Bedtime Johanna Zarate APRN CNP   15 mg at 07/31/24 2111    OLANZapine (zyPREXA) tablet 5 mg  5 mg Oral QAM Johanna Zarate APRN CNP   5 mg at 07/31/24 0843    prazosin (MINIPRESS) capsule 1 mg  1 mg Oral At Bedtime Johanna Zarate APRN CNP   1 mg at 07/31/24 2111          Precautions:     Behavioral Orders   Procedures    Assault precautions    Cheeking Precautions (behavioral units)    Code 1 - Restrict to Unit    Elopement precautions    Petition for commitment    Routine Programming     As clinically indicated    Sexual precautions    Status 15     Every 15 minutes.    Suicide precautions: Suicide Risk: LOW; Clinical rationale to override score: modification to the care environment     Patients on Suicide Precautions should have a Combination Diet ordered that includes a Diet selection(s) AND a Behavioral Tray selection for Safe Tray - with utensils, or Safe Tray - NO utensils       Order Specific Question:   Suicide Risk     Answer:   LOW     Order Specific Question:   Clinical rationale to override score:     Answer:   modification to the care environment         Allergies:     No Known Allergies      Labs:     No results found for this or any previous visit (from the past 24 hour(s)).        /85   Pulse 73   Temp 98.7  F (37.1  C) (Temporal)   Resp 16   Ht 1.676 m (5' 6\")   Wt 58.4 kg (128 lb " 11.2 oz)   SpO2 100%   BMI 20.77 kg/m      Weight is 128 lbs 11.2 oz  Body mass index is 20.77 kg/m .    Weight over time:  Vitals:    07/29/24 1836   Weight: 58.4 kg (128 lb 11.2 oz)       Orthostatic Vitals         Most Recent      Sitting Orthostatic /93  Comment: Provider Notified 07/30 0849    Sitting Orthostatic Pulse (bpm) 77 07/30 0849    Standing Orthostatic /99  Comment: Provider notified 07/30 0849    Standing Orthostatic Pulse (bpm) 103 07/30 0849              Cardiometabolic risk assessment. 07/31/24    Reviewed patient profile for cardiometabolic risk factors      Date taken /Value  REFERENCE RANGE   Abdominal Obesity  (Waist Circumference)   See nursing flowsheet Women ?35 in (88 cm)   Men ?40 in (102 cm)      Triglycerides  Triglycerides   Date Value Ref Range Status   07/30/2024 58 <150 mg/dL Final       ?150 mg/dL (1.7 mmol/L) or current treatment for elevated triglycerides   HDL cholesterol  Direct Measure HDL   Date Value Ref Range Status   07/30/2024 41 >=40 mg/dL Final   ]   Women <50 mg/dL (1.3 mmol/L) in women or current treatment for low HDL cholesterol  Men <40 mg/dL (1 mmol/L) in men or current treatment for low HDL cholesterol     Fasting plasma glucose (FPG) Lab Results   Component Value Date    GLC 88 07/30/2024      FPG ?100 mg/dL (5.6 mmol/L) or treatment for elevated blood glucose   Blood pressure  BP Readings from Last 3 Encounters:   07/31/24 136/85   07/29/24 132/74   03/26/24 131/74    Blood pressure ?130/85 mmHg or treatment for elevated blood pressure   Family History  See family history          Consults:         Entered by: NARAYAN Sales CNP on 08/01/2024  at 8:02 AM       Certain aspects of this note may be copied or templated. Content is updated and changed where needed to reflect the most recent assessment and plan of care. This document is created with the help of Dragon dictation system.  All grammatical/typing errors or context distortion are  unintentional and inherent to software.     Attestation:  Patient has been seen and evaluated by me, Johanna Zarate, APRN, CNP.  I spent >35 min on the date of the encounter in chart review, patient visit, review of tests, documentation, care coordination, and/or discussion with other providers about the issues documented above.

## 2024-08-01 NOTE — PLAN OF CARE
Upcoming Meetings and Dates/Important Information  Days since IP admission 3  Team Note Due Tuesday  No future appointments.     Next Steps      Assessment/Intervention/Current Symptoms and Care Coordination  Chart Review  Met with team to discuss patient care.  MCRO check at 10:21 AM indicates petition has been as of 7/31/2024. Court hearings are not yet scheduled. Case [id number]-  MCRO check at 2:38 PM indicates hearings not scheduled.    Discharge Plan or Goal  Dispo Plan: TBD  Transportation: TBD  Medication: TBD    Provisional discharge: TBD  Safety plan complete?: Not yet completed  Appointments:   TBD     Barriers to Discharge  patient requires further psychiatric stabilization due to current symptomology, medication management with possible adjustments, commitment petition in process       Expected Discharge Date: 08/09/2024        Discharge Comments: Petition for Commitment          Referral Status  No new referral made    Legal Status  72 hour hold expiring 7/30/2024 2120  [facility] Commitment  Petition with tate [id number]-  Tate meds requested Haldol, Risperdal, Zyprexa, Abilify    Contacts  Primary Care Provider:  No assigned PCP with [facility]  [address] [place] [address] [phone]     Medication Management/Psychiatry:   NARAYAN Mueller, CNP, PMHNP-BC with [facility] [address] [place] [address]  Phone [phone] Fax [phone]     :   Santos Smith () [phone]   [facility]  [address] [place] [address]  [email or web address]     CADI :   Teofilo Justin ([phone])   [facility]     Optometry 08/26/2024 9:20 AM  Reyna Calderon OD with [facility]   [address] [place] [address]   [phone]       Pharmacy   [facility] [address]  P:  [phone] F: [phone]

## 2024-08-01 NOTE — PLAN OF CARE
BEH IP Unit Acuity Rating Score (UARS)  Patient is given one point for every criteria they meet.    CRITERIA SCORING   On a 72 hour hold, court hold, committed, stay of commitment, or revocation. 1    Patient LOS on BEH unit exceeds 20 days. 0  LOS: 3   Patient under guardianship, 55+, otherwise medically complex, or under age 11. 0   Suicide ideation without relief of precipitating factors. 0   Current plan for suicide. 0   Current plan for homicide. 0   Imminent risk or actual attempt to seriously harm another without relief of factors precipitating the attempt. 0   Severe dysfunction in daily living (ex: complete neglect for self care, extreme disruption in vegetative function, extreme deterioration in social interactions). 1   Recent (last 7 days) or current physical aggression in the ED or on unit. 0   Restraints or seclusion episode in past 72 hours. 0   Recent (last 7 days) or current verbal aggression, agitation, yelling, etc., while in the ED or unit. 1 6 days since 7/26   Active psychosis. 0   Need for constant or near constant redirection (from leaving, from others, etc).  0   Intrusive or disruptive behaviors. 0   Patient requires 3 or more hours of individualized nursing care per 8-hour shift (i.e. for ADLs, meds, therapeutic interventions). 0   TOTAL 3

## 2024-08-02 PROCEDURE — H2032 ACTIVITY THERAPY, PER 15 MIN: HCPCS

## 2024-08-02 PROCEDURE — 124N000002 HC R&B MH UMMC

## 2024-08-02 PROCEDURE — 250N000013 HC RX MED GY IP 250 OP 250 PS 637: Performed by: REGISTERED NURSE

## 2024-08-02 PROCEDURE — 99232 SBSQ HOSP IP/OBS MODERATE 35: CPT | Performed by: REGISTERED NURSE

## 2024-08-02 RX ADMIN — ESCITALOPRAM OXALATE 10 MG: 5 TABLET, FILM COATED ORAL at 08:39

## 2024-08-02 RX ADMIN — OLANZAPINE 5 MG: 5 TABLET, FILM COATED ORAL at 08:39

## 2024-08-02 RX ADMIN — PRAZOSIN HYDROCHLORIDE 1 MG: 1 CAPSULE ORAL at 20:15

## 2024-08-02 RX ADMIN — NICOTINE POLACRILEX 2 MG: 2 LOZENGE ORAL at 18:30

## 2024-08-02 RX ADMIN — OLANZAPINE 15 MG: 15 TABLET, FILM COATED ORAL at 20:15

## 2024-08-02 ASSESSMENT — ACTIVITIES OF DAILY LIVING (ADL)
ADLS_ACUITY_SCORE: 28
ORAL_HYGIENE: INDEPENDENT
ADLS_ACUITY_SCORE: 28
LAUNDRY: WITH SUPERVISION
ADLS_ACUITY_SCORE: 28
ADLS_ACUITY_SCORE: 28
DRESS: INDEPENDENT
ADLS_ACUITY_SCORE: 28
HYGIENE/GROOMING: INDEPENDENT

## 2024-08-02 NOTE — PLAN OF CARE
"Problem: Psychotic Signs/Symptoms  Goal: Improved Mood Symptoms (Psychotic Signs/Symptoms)  Outcome: Progressing     Goal Outcome Evaluation:    Plan of Care Reviewed With: patient      Pt denies SI or AH when asked, and then adds that he has no symptoms. Affect brightens in conversation. Pt did a karate kick in [facility] area (witnessed x 1) and was at times laughing at movie (in a very loud manner). Pt was heard mumbling to self when pacing hallway, referred to his group home while cursing under his breath. Shortly after this, pt asked RN for any possible activities due to boredom, declined music group. Pt was provided coloring sheets and markers which he engaged in briefly. Pt mentioned he is very focused on finding employment and had been working in a warehouse. Ate 100 % of dinner. Pt asked for bedtime medications early at 1930 which he took without incident. Used electric bike before going to bed.     BP (!) 143/82   Pulse 64   Temp 99.4  F (37.4  C) (Temporal)   Resp 16   Ht 1.676 m (5' 6\")   Wt 58.4 kg (128 lb 11.2 oz)   SpO2 99%   BMI 20.77 kg/m      "

## 2024-08-02 NOTE — PROGRESS NOTES
Rehab Group     Start time: 1015  End time: 1110  Patient time total: 30 minutes     attended partial group     #5 attended   Group Type: music   Group Topic Covered: relaxation , self-care, and sensory intervention      Group Session Detail: Cooperatively engaged in Evening Music Relaxation group to decrease anxiety and promote positive socialization.        Patient Response/Contribution:  attentive      Patient Detail: Pt displayed resonance with spiritual music.  Was pleasant, calm and cooperative.  More assessment needed.       Activity Therapy Per 15 min ()     Patient Active Problem List   Diagnosis    Unspecified mood (affective) disorder (H24)    Schizophrenia (H)    Bipolar I disorder, most recent episode (or current) manic (H)

## 2024-08-02 NOTE — PLAN OF CARE
Upcoming Meetings and Dates/Important Information  Days since IP admission 4  Team Note Due Tuesday  Optometry 08/26/2024 9:20 AM  Exam Hearing on 08/06/2024 at 10:30 AM  Preliminary/Probable Cause Hearing on 08/06/2024 at 11:30 AM  [place] Hearing on 08/06/2024 at 11:31 AM    Next Steps  [facility]  Check in with group home    Assessment/Intervention/Current Symptoms and Care Coordination  Chart Review  Met with team to discuss patient care.  Recieved voicemail message from the court on 8/1/24 stating pt is on a court hold effective 8/1/24 at 4:18PM  Received court hold from the court via fax Provided copy to pt, placed copy in chart and sent copy to UR.   Received petition packet from the court via [employer] Provided copy to pt, placed copy in chart.  Per internal process, requested with behavioral administration staff to place pt's upcoming court hearings on [facility] and [facility] calendar. Remote hearing instructions were not included in request.     Discharge Plan or Goal  Dispo Plan: TBD  Transportation: TBD  Medication: TBD    Provisional discharge: TBD  Safety plan complete?: Not yet completed  Appointments:   TBD     Barriers to Discharge  patient requires further psychiatric stabilization due to current symptomology, medication management with possible adjustments, commitment petition in process       Expected Discharge Date: 08/09/2024        Discharge Comments: Petition for Commitment          Referral Status  No new referral made    Legal Status  Court Hold Effective 8/1/24 1618  [facility] Commitment  Petition with [place] [id number]  [place] meds requested Haldol, Risperdal, Zyprexa, Abilify    Contacts  Primary Care Provider:  No assigned PCP with [address] [id number] [phone]     Medication Management/Psychiatry:   Maci Lisa, APRN, CNP, PMHNP-BC with [facility] [address], [facility] [address] [place]  [address]  Phone [phone] Fax [phone]     :   Santos Smith () [phone]   [facility]  [address] [place] [address]  [email or web address]     CADI :   Teofilo Justin ([phone])   [facility]     Optometry 08/26/2024 9:20 AM  Reyna Calderon, OD with [facility]   [address] [place] [address]   [phone]       [id number] - [place], [place] - [address]  P: [phone] F: [phone]
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
## 2024-08-02 NOTE — PLAN OF CARE
Problem: Sleep Disturbance  Goal: Adequate Sleep/Rest  Outcome: Progressing   Goal Outcome Evaluation:    NOC Shift Report    Pt was in bed at the beginning of the shift. Breathing was regular, spontaneous, and unlabored. Pt did not present with any medical concerns this shift. There were no  PRNs given. The plan of care is ongoing.       Pt appears to sleep for 7 hours

## 2024-08-02 NOTE — PLAN OF CARE
Problem: Adult Behavioral Health Plan of Care  Goal: Adheres to Safety Considerations for Self and Others  Outcome: Progressing     Problem: Psychotic Signs/Symptoms  Goal: Improved Behavioral Control (Psychotic Signs/Symptoms)  Outcome: Progressing     Problem: Psychotic Signs/Symptoms  Goal: Improved Mood Symptoms (Psychotic Signs/Symptoms)  Outcome: Progressing    Pt's BP was 151/90 when taken at start of the shift. Pt denied having pain in this shift. Pt was seen pacing in a hallway with a headphone on. Pt has a flat affect but brightens on approached. Pt denied having anxiety and depression. Pt appeared to be guarded with an assessment questions and denied having SI/SIB/HI/AVH in this shift. Pt stated that he has been doing good since he moved out from a group home. Pt got engaged in a conversation with this writer and showed great interest in animation series. No outburst or major behavioral issue noted in this shift. Pt ate snacks and dinner on time. Pt is compliant with meds. No prn medication was requested or given in this shift.      Goal Outcome Evaluation:    Plan of Care Reviewed With: patient

## 2024-08-02 NOTE — PLAN OF CARE
BEH IP Unit Acuity Rating Score (UARS)  Patient is given one point for every criteria they meet.    CRITERIA SCORING   On a 72 hour hold, court hold, committed, stay of commitment, or revocation. 1    Patient LOS on BEH unit exceeds 20 days. 0  LOS: 4   Patient under guardianship, 55+, otherwise medically complex, or under age 11. 0   Suicide ideation without relief of precipitating factors. 0   Current plan for suicide. 0   Current plan for homicide. 0   Imminent risk or actual attempt to seriously harm another without relief of factors precipitating the attempt. 0   Severe dysfunction in daily living (ex: complete neglect for self care, extreme disruption in vegetative function, extreme deterioration in social interactions). 1   Recent (last 7 days) or current physical aggression in the ED or on unit. 0   Restraints or seclusion episode in past 72 hours. 0   Recent (last 7 days) or current verbal aggression, agitation, yelling, etc., while in the ED or unit. 1 7 days since 7/26   Active psychosis. 0   Need for constant or near constant redirection (from leaving, from others, etc).  0   Intrusive or disruptive behaviors. 0   Patient requires 3 or more hours of individualized nursing care per 8-hour shift (i.e. for ADLs, meds, therapeutic interventions). 0   TOTAL 3

## 2024-08-03 PROCEDURE — 124N000002 HC R&B MH UMMC

## 2024-08-03 PROCEDURE — 250N000013 HC RX MED GY IP 250 OP 250 PS 637: Performed by: REGISTERED NURSE

## 2024-08-03 PROCEDURE — G0177 OPPS/PHP; TRAIN & EDUC SERV: HCPCS

## 2024-08-03 RX ADMIN — PRAZOSIN HYDROCHLORIDE 1 MG: 1 CAPSULE ORAL at 20:09

## 2024-08-03 RX ADMIN — ESCITALOPRAM OXALATE 10 MG: 5 TABLET, FILM COATED ORAL at 08:43

## 2024-08-03 RX ADMIN — OLANZAPINE 15 MG: 15 TABLET, FILM COATED ORAL at 20:09

## 2024-08-03 RX ADMIN — OLANZAPINE 5 MG: 5 TABLET, FILM COATED ORAL at 08:43

## 2024-08-03 RX ADMIN — NICOTINE POLACRILEX 2 MG: 2 LOZENGE ORAL at 14:11

## 2024-08-03 ASSESSMENT — ACTIVITIES OF DAILY LIVING (ADL)
ADLS_ACUITY_SCORE: 28
ORAL_HYGIENE: INDEPENDENT
ADLS_ACUITY_SCORE: 28
ADLS_ACUITY_SCORE: 28
HYGIENE/GROOMING: INDEPENDENT
ADLS_ACUITY_SCORE: 28
DRESS: INDEPENDENT
ORAL_HYGIENE: INDEPENDENT
ADLS_ACUITY_SCORE: 28
HYGIENE/GROOMING: INDEPENDENT
DRESS: SCRUBS (BEHAVIORAL HEALTH);INDEPENDENT
ADLS_ACUITY_SCORE: 28
ADLS_ACUITY_SCORE: 28

## 2024-08-03 NOTE — PLAN OF CARE
"Goal Outcome Evaluation:    Plan of Care Reviewed With: patient        Pt up and visible on unit. Cooperative with peers and staff. Social.  Pt polite but guarded. Denies any depression or anxiety, Denies SI ,HI or  hallucinations.     Pt paces hallways at times but denies concerns. Says he feels \"very good today\". Pt is happy with double portions on his meal tray.            "

## 2024-08-03 NOTE — CARE PLAN
Rehab Group    Start time: 1315  End time: 1410  Patient time total: 50 minutes    attended full group    #4 attended   Group Type: occupational therapy   Group Topic Covered: cognitive activities, coping skills, emotional regulation, and self-esteem       Group Session Detail:  Education and group discussion on the benefits of changing negative thinking to positive with hands on Gratitude or Affirmation Personal Calendar for concentration, to help focus thoughts more positively, creative expression, follow through, coping, mood stabilization, reality-based activity, fostering hope for a sustainable recovery, and socialization.       Patient Response/Contribution:  socially appropriate and actively engaged       Patient Detail:  Pt was pleasant and fully engaged in working on his calendar.  Pt utilized some of the samples and modified affirmations to fit his needs.  Pt was social with peers, a bit awkward at times.  On occasion, he would randomly mention rather odd topics and ideas, though none were of inappropriate content.           Group code      Patient Active Problem List   Diagnosis    Unspecified mood (affective) disorder (H24)    Schizophrenia (H)    Bipolar I disorder, most recent episode (or current) manic (H)

## 2024-08-03 NOTE — PLAN OF CARE
Goal Outcome Evaluation:    Problem: Sleep Disturbance  Goal: Adequate Sleep/Rest  Outcome: Progressing       NOC Shift Report    Pt in bed at beginning of shift, breathing quiet and unlabored. Pt slept through shift. Pt slept 7 hours.     No pt complaints or concerns at this time.     No PRNs given. Will continue to monitor.

## 2024-08-04 PROCEDURE — 250N000013 HC RX MED GY IP 250 OP 250 PS 637: Performed by: REGISTERED NURSE

## 2024-08-04 PROCEDURE — 124N000002 HC R&B MH UMMC

## 2024-08-04 PROCEDURE — 90853 GROUP PSYCHOTHERAPY: CPT | Performed by: COUNSELOR

## 2024-08-04 RX ADMIN — PRAZOSIN HYDROCHLORIDE 1 MG: 1 CAPSULE ORAL at 20:01

## 2024-08-04 RX ADMIN — OLANZAPINE 5 MG: 5 TABLET, FILM COATED ORAL at 08:16

## 2024-08-04 RX ADMIN — ESCITALOPRAM OXALATE 10 MG: 5 TABLET, FILM COATED ORAL at 08:15

## 2024-08-04 RX ADMIN — OLANZAPINE 15 MG: 15 TABLET, FILM COATED ORAL at 20:01

## 2024-08-04 ASSESSMENT — ACTIVITIES OF DAILY LIVING (ADL)
ADLS_ACUITY_SCORE: 28
ORAL_HYGIENE: INDEPENDENT
ADLS_ACUITY_SCORE: 28
HYGIENE/GROOMING: INDEPENDENT
ADLS_ACUITY_SCORE: 28
ADLS_ACUITY_SCORE: 28
HYGIENE/GROOMING: INDEPENDENT
ADLS_ACUITY_SCORE: 28
DRESS: SCRUBS (BEHAVIORAL HEALTH);INDEPENDENT
ORAL_HYGIENE: INDEPENDENT
ADLS_ACUITY_SCORE: 28

## 2024-08-04 NOTE — PLAN OF CARE
"Goal Outcome Evaluation:    Plan of Care Reviewed With: patient        Pt pleasant and cooperative. Denies anxiety or depression, denies SI or hallucinations. Answers my questions but conversation superficial , offering little information. Pt says \"I feel great\".     Medication compliant and no reports of unwanted side effects. Eating and drinking well.    Pt likes to visit with staff and also watched the Olympics with peers.            "

## 2024-08-04 NOTE — PLAN OF CARE
Goal Outcome Evaluation:    Plan of Care Reviewed With: patient        Problem: Psychotic Signs/Symptoms  Goal: Improved Behavioral Control (Psychotic Signs/Symptoms)  Outcome: Progressing       Patient was visible in the milieu, mostly pacing in the hallway with headphones on. He was singing loudly at times. He also watched TV in the lounge and was observed laughing loudly.     Pleasant when approached. Denied all mental health symptoms. Med compliant. No physical problems reported so far. Will continue to monitor.

## 2024-08-04 NOTE — PLAN OF CARE
Problem: Sleep Disturbance  Goal: Adequate Sleep/Rest  Outcome: Progressing   Goal Outcome Evaluation:    Pt appears to be asleep at start of shift. 15 minutes safety checks done. Pt slept for a total of 7 hours.

## 2024-08-05 PROCEDURE — 124N000002 HC R&B MH UMMC

## 2024-08-05 PROCEDURE — 250N000013 HC RX MED GY IP 250 OP 250 PS 637: Performed by: REGISTERED NURSE

## 2024-08-05 PROCEDURE — 97150 GROUP THERAPEUTIC PROCEDURES: CPT | Mod: GO

## 2024-08-05 PROCEDURE — 99232 SBSQ HOSP IP/OBS MODERATE 35: CPT | Performed by: REGISTERED NURSE

## 2024-08-05 RX ADMIN — OLANZAPINE 5 MG: 5 TABLET, FILM COATED ORAL at 08:26

## 2024-08-05 RX ADMIN — OLANZAPINE 15 MG: 15 TABLET, FILM COATED ORAL at 20:00

## 2024-08-05 RX ADMIN — PRAZOSIN HYDROCHLORIDE 1 MG: 1 CAPSULE ORAL at 20:00

## 2024-08-05 RX ADMIN — ESCITALOPRAM OXALATE 10 MG: 5 TABLET, FILM COATED ORAL at 08:26

## 2024-08-05 ASSESSMENT — ACTIVITIES OF DAILY LIVING (ADL)
ADLS_ACUITY_SCORE: 28
HYGIENE/GROOMING: INDEPENDENT
ADLS_ACUITY_SCORE: 28
ORAL_HYGIENE: INDEPENDENT
ADLS_ACUITY_SCORE: 28
DRESS: SCRUBS (BEHAVIORAL HEALTH);INDEPENDENT
ADLS_ACUITY_SCORE: 28

## 2024-08-05 NOTE — PLAN OF CARE
BEH IP Unit Acuity Rating Score (UARS)  Patient is given one point for every criteria they meet.    CRITERIA SCORING   On a 72 hour hold, court hold, committed, stay of commitment, or revocation. 1    Patient LOS on BEH unit exceeds 20 days. 0  LOS: 7   Patient under guardianship, 55+, otherwise medically complex, or under age 11. 0   Suicide ideation without relief of precipitating factors. 0   Current plan for suicide. 0   Current plan for homicide. 0   Imminent risk or actual attempt to seriously harm another without relief of factors precipitating the attempt. 0   Severe dysfunction in daily living (ex: complete neglect for self care, extreme disruption in vegetative function, extreme deterioration in social interactions). 1   Recent (last 7 days) or current physical aggression in the ED or on unit. 0   Restraints or seclusion episode in past 72 hours. 0   Recent (last 7 days) or current verbal aggression, agitation, yelling, etc., while in the ED or unit. 1 10 days since 7/26   Active psychosis. 0   Need for constant or near constant redirection (from leaving, from others, etc).  0   Intrusive or disruptive behaviors. 0   Patient requires 3 or more hours of individualized nursing care per 8-hour shift (i.e. for ADLs, meds, therapeutic interventions). 0   TOTAL 3

## 2024-08-05 NOTE — PLAN OF CARE
Goal Outcome Evaluation:    Plan of Care Reviewed With: patient         Problem: Psychotic Signs/Symptoms  Goal: Improved Behavioral Control (Psychotic Signs/Symptoms)  Outcome: Progressing      Patient was visible in the milieu. Social and engaged with peers. He was loudly singing at times. Pleasant when approached. Denied all mental health symptoms. Denied pain. Med compliant. Attended group. No complaints made so far.

## 2024-08-05 NOTE — PLAN OF CARE
Upcoming Meetings and Dates/Important Information  Days since IP admission 7  Team Note Due Tuesday  Optometry 08/26/2024 9:20 AM  Exam Hearing on 08/06/2024 at 10:45 AM  Preliminary/Probable Cause Hearing on 08/06/2024 at 11:30 AM  Tate Hearing on 08/06/2024 at 11:31 AM    Next Steps  [facility]  Check in with group home    Assessment/Intervention/Current Symptoms and Care Coordination  Chart Review  Met with team to discuss patient care.  Pt met with his  on the unit    Discharge Plan or Goal  Dispo Plan: TBD  Transportation: TBD  Medication: TBD    Provisional discharge: TBD  Safety plan complete?: Not yet completed  Appointments:   TBD     Barriers to Discharge  patient requires further psychiatric stabilization due to current symptomology, medication management with possible adjustments, commitment petition in process       Expected Discharge Date: 08/09/2024        Discharge Comments: Petition for Commitment          Referral Status  No new referral made    Legal Status  Court Hold Effective 8/1/24 1618  [facility] Commitment  Petition with tate [id number]  Tate meds requested Haldol, Risperdal, Zyprexa, Abilify    Contacts  Primary Care Provider:  No assigned PCP with [facility]  [address] [place] [address] [phone]     Medication Management/Psychiatry:   NARAYAN Mueller, CNP, PMHNP-BC with [facility] [address], [address] [place] [address]  Phone [phone] Fax [phone]     :   Santos Smith () [phone]   [facility]  [address] [place] [address]  [email or web address]     CADI :   Teofilo Justin ([phone])   [facility]     Optometry 08/26/2024 9:20 AM  Reyna Calderon, GERALDO with [facility]   [address] [place], [place] [address] [phone]       Pharmacy   [facility] [facility]  [place] - [address] [facility]  P: [phone] F: [phone]

## 2024-08-05 NOTE — PLAN OF CARE
Goal Outcome Evaluation:      Group Attendance:  attended full group    Time session began: 8:10 pm  Time session ended: 9:00 pm  Patient's total time in group: 50    Total # Attendees   3   Group Type psychotherapeutic     Group Topic Covered insight improvement, healthy coping skills, and CBT skills cognitive distortions     Group Session Detail Process/ art/ cognitive distortions discussion     Patient's response to the group topic/interactions:  positive affect, cooperative with task, supportive of peers, listened actively, attentive, actively engaged, and distracted      Patient Details: Feeling- thoughtful, hopeful, excited. He is better, more organized, less tense. He refused safety plan previously but said tomorrow we can complete it. He  was conversational about our topic and also very patient with peer T who was disorganized and rambling. He also was helpful to writer and asked if he could help clean up from art activity for writer. The only minor issue is he would drift off and seem to be in his head at times, zone out and ask for repetition of a prompt. He did apologize and was aware he was zoning out. He chose a positive quote by  for his art.           [id number] - Group psychotherapy - 1 Session    Patient Active Problem List   Diagnosis    Unspecified mood (affective) disorder (H24)    Schizophrenia (H)    Bipolar I disorder, most recent episode (or current) manic (H)
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
## 2024-08-05 NOTE — PLAN OF CARE
Problem: Adult Behavioral Health Plan of Care  Goal: Plan of Care Review  Outcome: Progressing     Problem: Adult Behavioral Health Plan of Care  Goal: Optimized Coping Skills in Response to Life Stressors  Outcome: Progressing     Problem: Adult Behavioral Health Plan of Care  Goal: Adheres to Safety Considerations for Self and Others  Outcome: Progressing     Goal Outcome Evaluation:        Care plan reviewed with patient. Pt is pleasant, affect is upbeat. Pt asks for and receives his morning medications. Pt continues to eat well and takes his meals in the dining area. Pt denies pain. He showers and shaves after breakfast.     Pt ambulates in hallway with headphones. He is visited by his  today.    Dixie Chatman RN

## 2024-08-05 NOTE — CARE PLAN
Rehab Group    Start time: 1315  End time: 1400  Patient time total: 45 minutes    attended full group    #5 attended   Group Type: occupational therapy   Group Topic Covered: balanced lifestyle, cognitive activities, coping skills, healthy leisure time, problem solving, self-esteem, and social skills       Group Session Detail:   Hands on healthy leisure exploration with [employer]-Chi Card Game for concentration, tracking, sequencing, attention to detail, follow through, coping, mood stabilization, reality-based activity, an opportunity to experience success and expansion of social skills.       Patient Response/Contribution:  actively engaged and distracted        Patient Detail:  Pt was flat in affect, though pleasant and able to learn a new activity relatively quickly.  He needed cues for turn taking at times as he would stare off into space frequently.  Pt was helpful with collecting cards at the end of each round, shuffling, and clean up at the end of the group session.          [id number] OT Group (2 or more in attendance)      Patient Active Problem List   Diagnosis    Unspecified mood (affective) disorder (H24)    Schizophrenia (H)    Bipolar I disorder, most recent episode (or current) manic (H)

## 2024-08-06 PROCEDURE — G0177 OPPS/PHP; TRAIN & EDUC SERV: HCPCS

## 2024-08-06 PROCEDURE — 99232 SBSQ HOSP IP/OBS MODERATE 35: CPT | Performed by: REGISTERED NURSE

## 2024-08-06 PROCEDURE — 97150 GROUP THERAPEUTIC PROCEDURES: CPT | Mod: GO

## 2024-08-06 PROCEDURE — 124N000002 HC R&B MH UMMC

## 2024-08-06 PROCEDURE — 250N000013 HC RX MED GY IP 250 OP 250 PS 637: Performed by: REGISTERED NURSE

## 2024-08-06 RX ADMIN — OLANZAPINE 15 MG: 15 TABLET, FILM COATED ORAL at 19:51

## 2024-08-06 RX ADMIN — OLANZAPINE 5 MG: 5 TABLET, FILM COATED ORAL at 08:09

## 2024-08-06 RX ADMIN — PRAZOSIN HYDROCHLORIDE 1 MG: 1 CAPSULE ORAL at 19:51

## 2024-08-06 RX ADMIN — NICOTINE POLACRILEX 2 MG: 2 LOZENGE ORAL at 09:04

## 2024-08-06 RX ADMIN — ESCITALOPRAM OXALATE 10 MG: 5 TABLET, FILM COATED ORAL at 08:09

## 2024-08-06 ASSESSMENT — ACTIVITIES OF DAILY LIVING (ADL)
ADLS_ACUITY_SCORE: 28
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
## 2024-08-06 NOTE — PLAN OF CARE
BEH IP Unit Acuity Rating Score (UARS)  Patient is given one point for every criteria they meet.    CRITERIA SCORING   On a 72 hour hold, court hold, committed, stay of commitment, or revocation. 1    Patient LOS on BEH unit exceeds 20 days. 0  LOS: 8   Patient under guardianship, 55+, otherwise medically complex, or under age 11. 0   Suicide ideation without relief of precipitating factors. 0   Current plan for suicide. 0   Current plan for homicide. 0   Imminent risk or actual attempt to seriously harm another without relief of factors precipitating the attempt. 0   Severe dysfunction in daily living (ex: complete neglect for self care, extreme disruption in vegetative function, extreme deterioration in social interactions). 1   Recent (last 7 days) or current physical aggression in the ED or on unit. 0   Restraints or seclusion episode in past 72 hours. 0   Recent (last 7 days) or current verbal aggression, agitation, yelling, etc., while in the ED or unit. 1 11 days since 7/26   Active psychosis. 0   Need for constant or near constant redirection (from leaving, from others, etc).  0   Intrusive or disruptive behaviors. 0   Patient requires 3 or more hours of individualized nursing care per 8-hour shift (i.e. for ADLs, meds, therapeutic interventions). 0   TOTAL 3

## 2024-08-06 NOTE — PLAN OF CARE
"Goal Outcome Evaluation:    Plan of Care Reviewed With: patient        Problem: Psychotic Signs/Symptoms  Goal: Improved Behavioral Control (Psychotic Signs/Symptoms)  Outcome: Progressing          Patient described his mood as \"good\". He denied SI, HI and hallucinations. He was visible in the milieu, mostly pacing in the hallway or watching TV. He had periods of being talkative to staff but was not being loud this shift. Patient declined to attend group saying he was \"tired\". He was compliant with meds. No PRNs requested. Denied pain. Will continue to monitor.      "

## 2024-08-06 NOTE — PLAN OF CARE
08/06/24 1031   Interprofessional Rounds   Summary Petition for commitment. Doing well on unit, going to groups. We informed  we are OK with a stay.   Participants nursing;advanced practice nurse;CTC   Behavioral Team Discussion   Participants NARAYAN Sales, CNP; Tiara Duval [facility]; Dixie Chatman RN   Progress Doing will   Anticipated length of stay 5-7 days   Continued Stay Criteria/Rationale finalize petition   Medical/Physical None impacted by treatment   Plan Petition for commitment.   Anticipated Discharge Disposition group home     PRECAUTIONS AND SAFETY    Behavioral Orders   Procedures    Assault precautions    Cheeking Precautions (behavioral units)    Code 1 - Restrict to Unit    Elopement precautions    Petition for commitment    Routine Programming     As clinically indicated    Sexual precautions    Status 15     Every 15 minutes.    Suicide precautions: Suicide Risk: LOW; Clinical rationale to override score: modification to the care environment     Patients on Suicide Precautions should have a Combination Diet ordered that includes a Diet selection(s) AND a Behavioral Tray selection for Safe Tray - with utensils, or Safe Tray - NO utensils       Order Specific Question:   Suicide Risk     Answer:   LOW     Order Specific Question:   Clinical rationale to override score:     Answer:   modification to the care environment       Safety  Safety WDL: WDL  [place] [facility]  Observed Behavior: sitting  Observed Behavior (Comment): listening to music  Safety Measures: safety rounds completed, suicide assessment completed, suicide check-in completed  Diversional Activity: music, television  Suicidality: Status 15  Assault: status 15, private room  Elopement Assessment: Distress about legal situation (holds for mental health or criminal)  Elopement Interventions: status 15, behavioral scrubs (pajamas)  Sexual: status 15, private room  Additional Documentation:   (Cheeking precaution inplace)

## 2024-08-06 NOTE — PROGRESS NOTES
Rehab Group    Start time:10:15  End time: 11:45  Patient time total: 90 minutes    attended full group    #5 attended   Group Type: OT Clinic   Group Topic Covered: balanced lifestyle, coping skills, healthy leisure time, and problem solving     Group Session Detail:  Occupational Therapy Clinic group to facilitate coping skill exploration, use of cognitive skills and problem solving, creative expression, clinical observation and facilitation of social, cognitive, and kinesthetic performance skills.       Patient Response/Contribution:  positive affect, cooperative with task, organized, socially appropriate, safe use of materials/supplies, listened actively, attentive, and actively engaged     Patient Detail:  Initiated group and demonstrated interest in unfamiliar sand art activity. Was attentive when verbal and demonstrated instructions were given. Applied information provided  with success and pride. Strategically planned out his 3D design. Bright, pleasant and social. Attentive to peers conversation regarding their birds and asked appropriate/inquisitive questions. Left shortly for an appointment but independently returned to group.      [id number] OT Group (2 or more in attendance)      Patient Active Problem List   Diagnosis    Unspecified mood (affective) disorder (H24)    Schizophrenia (H)    Bipolar I disorder, most recent episode (or current) manic (H)

## 2024-08-06 NOTE — PROGRESS NOTES
Rehab Group    Start time: 1315  End time: 1400  Patient time total: 45 minutes    attended full group    #2 attended   Group Type: general health and coping and recreation   Group Topic Covered: activity therapy, balanced lifestyle, coping skills, healthy leisure time, problem solving, and social skills     Group Session Detail:  Education was provided regarding the reasoning for utilizing games as therapeutic modalities and the benefits of engagement. Patient actively engaged in therapeutic game (Totika) in order to: improve social skills, encourage new learning, leisure exploration, exercise cognitive skills, improve concentration, and encourage healthy leisure engagements.      Patient Response/Contribution:  positive affect, cooperative with task, organized, supportive of peers, socially appropriate, safe use of materials/supplies, listened actively, expressed understanding of topic, and actively engaged     Patient Detail:  Initiated group following overhead announcement. Bright, pleasant and social. Assisted in selection of group activity. Attentive and engaged the entire 45 minute session focused on fine motor skills and executive skill application. Appropriately adjusted his decisions/moves based on movement of other pieces and balance of the structure. Appropriately and spontaneously responded to self esteem questions and asked others appropriate and curious questions.       [id number] OT Group (2 or more in attendance)      Patient Active Problem List   Diagnosis    Unspecified mood (affective) disorder (H24)    Schizophrenia (H)    Bipolar I disorder, most recent episode (or current) manic (H)

## 2024-08-06 NOTE — DISCHARGE INSTRUCTIONS
Behavioral Discharge Planning and Instructions    Summary: You were admitted on 7/29/2024 due to agitation and disruptive behavior.  You were treated by NARAYAN Sales CNP for ongoing psychiatric assessment and medication management.  You had opportunities to participate in therapeutic groups on the unit. You were discharged on 8/12/2024 from [facility] to [facility] located at [address] [place] [address].    Commitment:  On 8/6/2024, you were committed as a person who poses a risk of harm due to a mental illness to the Commissioner of Human Services and to the head of [facility]. This commitment was stayed on conditions outlined in your order. If there is psychiatric decompensation, an integral condition of the stay had been substantially violated, or your treatment needs are not being met, the stay may be vacated and the commitment executed. The stay and further court proceedings will terminate on 2/6/2025 unless the stay has been revoked before that date or is continued by Order of the Court.     You will be assigned a [facility]  to monitor your Stay of Commitment and report your progress to the courts. Until you are assigned a , your case management contact is:    YAW Oden  [facility] Intake  Phone [phone] Fax [phone]   [email or web address]    Primary Diagnosis:  Schizophrenia, decompensated     Secondary Diagnoses:   Cannabis use disorder, per chart  Tobacco use, r/o disorder  Trauma, r/o PTSD    Health Care Follow-up:   Psychiatric Medication Management Tuesday August 13th at 3:30 PM in person  NARAYAN Mueller, CNP, PMHNP-BC with [facility] [address], [address] [place] [address]  Phone [phone] Fax [phone]    Primary Care Provider:- for high blood pressure Wednesday August 14th at 11AM in person    Provider: Dr. Mayers  Address: [address] [place] [address]   Phone: [phone]  Fax: [phone]     Attend all scheduled appointments with your outpatient providers. Call at least 24 hours in advance if you need to reschedule an appointment to ensure continued access to your outpatient providers.     Major Treatments, Procedures and Findings:  You were provided with: a psychiatric assessment, assessed for medical stability, medication evaluation and/or management, group therapy, individual therapy, CD evaluation/assessment, and milieu management    Symptoms to Report: feeling more aggressive, increased confusion, losing more sleep, mood getting worse, or thoughts of suicide    Early warning signs can include: increased depression or anxiety sleep disturbances increased thoughts or behaviors of suicide or self-harm  increased unusual thinking, such as paranoia or hearing voices    Safety and Wellness:  Take all medicines as directed.  Make no changes unless your doctor suggests them.      Follow treatment recommendations.  Refrain from alcohol and non-prescribed drugs.  If there is a concern for safety, call 911.    Resources:   Mental Health Crisis Resources  Throughout [place]: call **CRISIS (**641584)  Crisis Text Line: is available for free, 24/7 by texting [place] to 302241  Suicide Awareness Voices of Education (SAVE) (www.save.org): 752-235-UZVP (6739)  The National Suicide Prevention Lifeline is now: 988 Suicide and Crisis Lifeline. Call 988 anytime.  National Rulo on Mental Illness (www.[place].josemanuel.org): 421.382.1049 or 597-195-3930.  Ajau5rjmp: text the word LIFE to 18326 for immediate support and crisis intervention  Mental Health Consumer/Survivor Network of [place] (www.mhcsn.net): 989.626.2619 or 221-459-4209  Mental Health Association of [place] (www.mentalhealth.org): 529.488.6284 or 368-568-5687  Peer Support Connection [place] Warmline (PSC) 1-200.231.7027 Available from 5pm - 9am (7 days a  week/365 days a year)  [facility] 3-492-460-7505    General Medication Instructions:   See your medication sheet(s) for instructions.   Take all medicines as directed.  Make no changes unless your doctor suggests them.   Go to all your doctor visits.  Be sure to have all your required lab tests. This way, your medicines can be refilled on time.  Do not use any drugs not prescribed by your doctor.  Avoid alcohol.    Advance Directives:   Scanned document on file with [employer]? No scanned doc  Is document scanned? Pt states no documents  Honoring Choices Your Rights Handout: Informed and given  Was more information offered? Pt declined    The Treatment team has appreciated the opportunity to work with you. If you have any questions or concerns about your recent admission, you can contact the unit which can receive your call 24 hours a day, 7 days a week. They will be able to get in touch with a Provider if needed. The unit number is [phone].

## 2024-08-06 NOTE — PLAN OF CARE
Upcoming Meetings and Dates/Important Information  Days since IP admission 8  Team Note Due Tuesday  Optometry 08/26/2024 9:20 AM    Next Steps  Check in with group home  Psych appointment    Assessment/Intervention/Current Symptoms and Care Coordination  Chart Review  Met with team to discuss patient care.  Spoke with Reny at  office and provided update on pt. Informed her we would be OK with settling on a stay.  Pt attended exam hearing. Because a stay was settled, the prelim hearing was stricken.  Received stay agreement and case plan from . Provided to pt who reviewed with his  on the phone and signed. I faxed it to his .  Completed [facility] DA and sent to Eben Spain.  MCRO check at 2:48 PM indicates stay agreement was taken under advisement.    Discharge Plan or Goal  Dispo Plan: Return to [facility]  Transportation: TBD  Medication: TBD    Provisional discharge: Will not need  Safety plan complete?: Not yet completed  Appointments:   TBD     Barriers to Discharge  patient requires further psychiatric stabilization due to current symptomology, medication management with possible adjustments, commitment petition in process       Expected Discharge Date: 08/09/2024        Discharge Comments: Petition for Commitment          Referral Status  No new referral made    Legal Status  Court Hold Effective 8/1/24 1618  [facility] Commitment  Petition with tate 60-TP-VR-  Tate meds requested Haldol, Risperdal, Zyprexa, Abilify    Contacts  Primary Care Provider:  No assigned PCP with [address] [address] [phone]     Medication Management/Psychiatry:   Maci Lisa, APRN, CNP, PMHNP-BC with [facility] [address] [place] [address]  Phone [phone] Fax [phone]     :   Santos Smith () [phone]   [facility]  Link  [address] [place] [address]  [email or web address]     CADI :   Teofilo Justin ([phone])   [facility]     Optometry 08/26/2024 9:20 AM  Reyna Calderon, GERALDO with [facility]   [address] [place] [address]   [phone]       [facility] [place] [facility] [address]  P: [phone] F: [phone]      Mental Health   YAW Oden  [facility] Intake  P: [phone] F: [phone]   [email or web address]

## 2024-08-06 NOTE — PLAN OF CARE
Goal Outcome Evaluation:    IP Treatment Plan    Client's Name: Jw Broderick  YOB: 2000      Treatment Plan Date: August 6, 2024      Anticipated number of sessions for this episode of care: 3    Current Concerns/Problem Areas:    Goal 1: Practice mindfulness meditation to reduce daily stress levels.  Improve restlessness he indicatedPt will increase the use of healthy coping skills from 2 times to 4 times/day      Intervention(s)    Provided positive reinforcement for progress towards goals, gains in knowledge, and application of skills previously taught, Identified stress relief practices, and Explored strategies for self-soothing        Pt centered considerations  Cultural considerations [place], he declines to speak abut bio family, he says he has no contact with them.

## 2024-08-06 NOTE — PLAN OF CARE
"Goal Outcome Evaluation:    Individual Therapy Note      Date of Service: August 6, 2024    Patient: Jw goes by \"Jw,\" uses he/him pronouns    Individuals Present: JelaniEDGAR Adhikari    Session start: 2:30 pm  Session end: 3:00 pm  Session duration in minutes: 30      Modality Used: DBT and Person Centered    Goals: safety planning and check in- group became 1:1 session , one other peer attended for just a few minutes prior to her discharge    Patient Description of current symptoms: always happy. Relieved for how abner went, relieved to get back to the group home     Mental Status Exam:   Attitude: cooperative and evasive  Eye Contact: fair  Mood: better  Affect: intensity is heightened  Speech: clear, coherent  Psychomotor Behavior: no evidence of tardive dyskinesia, dystonia, or tics  Thought Process:  linear  Associations: no loose associations  Thought Content: no evidence of suicidal ideation or homicidal ideation  Insight: fair  Judgement: fair  Attention Span and Concentration: fair    Pt progress: Pt said abner went well and he is hoping sometime next week he will discharge back to his group home in [place]. We did review DBT chain analysis. He said he would have done differently, when he was doing gymnastics and flips in his room and accidentally made a hole in the wall, that he should \" probably do this outside.    Treatment Objective(s) Addressed:   The focus of this session was on rapport building, orienting the patient to therapy, safety planning, identifying an appropriate aftercare plan, and building distress tolerance     Progress Towards Goals and Assessment of Patient:   Patient is making progress towards treatment goals as evidenced by pleasant, cooperative and engaged. At beginning of hospitalization he was on edge and refused safety planning, today he was able to complete it.      Therapeutic Intervention(s):   Provided active listening, unconditional " positive regard, and validation.   Engaged in safety planning identifying coping skills, warning signs, health support and resources, Explored motivation for behavioral change, Coached on coping techniques/relaxation skills to help improve distress tolerance and managing intense emotions. , and Identified stress relief practices      Plan/next step: pt reports he believes he will discharge  back home next week, he would like to get a job    [address] - Psychotherapy (with patient) - 30 (16-37*) min    Patient Active Problem List   Diagnosis    Unspecified mood (affective) disorder (H24)    Schizophrenia (H)    Bipolar I disorder, most recent episode (or current) manic (H)

## 2024-08-06 NOTE — PLAN OF CARE
Goal Outcome Evaluation:      Problem: Adult Behavioral Health Plan of Care  Goal: Plan of Care Review (reviewed with patient)  Outcome: Progressing     Problem: Psychotic Signs/Symptoms  Goal: Improved Behavioral Control (Psychotic Signs/Symptoms)  Outcome: Progressing     Problem: Psychotic Signs/Symptoms  Goal: Optimal Cognitive Function (Psychotic Signs/Symptoms)  Outcome: Progressing     Problem: Psychotic Signs/Symptoms  Goal: Increased Participation and Engagement (Psychotic Signs/Symptoms)  Outcome: Progressing    Pt is pleasant and bright. Pt denies pain. Denies SI. Pt showers this morning per his routine. Pt reports feeling optimistic about his legal status (stay of commitment). He ambulates up-and-down hallway intermittently throughout shift.     He attends groups and also meets with therapist to discuss safety plan.    Dixie Chatman RN

## 2024-08-07 PROCEDURE — 90853 GROUP PSYCHOTHERAPY: CPT

## 2024-08-07 PROCEDURE — 99232 SBSQ HOSP IP/OBS MODERATE 35: CPT | Performed by: REGISTERED NURSE

## 2024-08-07 PROCEDURE — 97150 GROUP THERAPEUTIC PROCEDURES: CPT | Mod: GO

## 2024-08-07 PROCEDURE — 124N000002 HC R&B MH UMMC

## 2024-08-07 PROCEDURE — 250N000013 HC RX MED GY IP 250 OP 250 PS 637: Performed by: REGISTERED NURSE

## 2024-08-07 RX ADMIN — NICOTINE POLACRILEX 2 MG: 2 LOZENGE ORAL at 19:42

## 2024-08-07 RX ADMIN — ESCITALOPRAM OXALATE 10 MG: 5 TABLET, FILM COATED ORAL at 08:50

## 2024-08-07 RX ADMIN — PRAZOSIN HYDROCHLORIDE 1 MG: 1 CAPSULE ORAL at 19:42

## 2024-08-07 RX ADMIN — OLANZAPINE 5 MG: 5 TABLET, FILM COATED ORAL at 08:50

## 2024-08-07 RX ADMIN — OLANZAPINE 15 MG: 15 TABLET, FILM COATED ORAL at 19:42

## 2024-08-07 ASSESSMENT — ACTIVITIES OF DAILY LIVING (ADL)
HYGIENE/GROOMING: INDEPENDENT
ADLS_ACUITY_SCORE: 28
DRESS: INDEPENDENT;SCRUBS (BEHAVIORAL HEALTH)
ADLS_ACUITY_SCORE: 28
ORAL_HYGIENE: INDEPENDENT
ADLS_ACUITY_SCORE: 28
LAUNDRY: WITH SUPERVISION
ADLS_ACUITY_SCORE: 28
ADLS_ACUITY_SCORE: 28
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
## 2024-08-07 NOTE — PLAN OF CARE
"  Problem: Psychotic Signs/Symptoms  Goal: Optimal Cognitive Function (Psychotic Signs/Symptoms)  Outcome: Progressing   Goal Outcome Evaluation:    Plan of Care Reviewed With: patient      \"I'm better, I feel good.\" Denies depression, anxiety, racing thoughts hallucinations, SI and SIB. States attends most the groups. Is a \"little\" social.            " unable to assess

## 2024-08-07 NOTE — PLAN OF CARE
Upcoming Meetings and Dates/Important Information  Days since IP admission 9  Team Note Due Tuesday  Optometry 08/26/2024 9:20 AM    Next Steps  Check in with group home  Psych appointment    Assessment/Intervention/Current Symptoms and Care Coordination  Chart Review  Met with team to discuss patient care.  MCRO check indicates stay of commit is on the record.  Called [facility] left voicemail message requesting order for stayed commitment be emailed to me and left my email address.    Discharge Plan or Goal  Dispo Plan: [facility]  Transportation: TBD  Medication: TBD    Provisional discharge: Will not need  Safety plan complete?: Not yet completed  Appointments:   TBD     Barriers to Discharge  patient requires further psychiatric stabilization due to current symptomology, medication management with possible adjustments, commitment petition in process       Expected Discharge Date: 08/12/2024        Discharge Comments: Stay of commitment          Referral Status  No new referral made    Legal Status  Court Hold Effective 8/1/24 1618  [employer] Commitment  Petition with [id number]  Tate meds requested Haldol, Risperdal, Zyprexa, Abilify    Contacts  Primary Care Provider:  No assigned PCP with [facility]  [address] [place] [address] [phone]     Medication Management/Psychiatry:   NARAYAN Mueller, CNP, PMHNP-BC with [facility] [address], [address] [place] [address]  Phone [phone] Fax [phone]     :   Santos Smith () [phone]   [facility]  [address] [place] [address]  [email or web address]     CADI :   Teofilo Justin ([phone])   [facility]     Optometry 08/26/2024 9:20 AM  Reyna Calderon OD with [facility]   [address] [place] [address] [phone]       Pharmacy   [place]  [facility] [place] - [address]  P: [phone] F: [phone]      Mental Health   Eben Spain, [facility] [address] Intake  P: [phone] F: [phone]   [email or web address]

## 2024-08-07 NOTE — PLAN OF CARE
Problem: Psychotic Signs/Symptoms  Goal: Improved Behavioral Control (Psychotic Signs/Symptoms)  Outcome: Progressing   Goal Outcome Evaluation:    Pt intermittently in the milieu, in the lounge watching tv and pacing in the hallway. He presents with bright affect, cooperative with care. Pt denies blair and all MH symptoms. Ate 100% dinner. Attended group. Plan of care on going, will continue to monitor.    Vital signs:  Temp: 98  F (36.7  C) Temp src: Temporal BP: (!) 147/74 Pulse: 72   Resp: 17 SpO2: 100 %

## 2024-08-07 NOTE — PROGRESS NOTES
Rehab Group    Start time: 1015  End time: 1100  Patient time total: 45 minutes    attended full group    #2 attended   Group Type: OT Clinic   Group Topic Covered: balanced lifestyle, coping skills, emotional regulation, and problem solving     Group Session Detail:  Occupational Therapy Clinic group to facilitate coping skill exploration, use of cognitive skills and problem solving, creative expression, clinical observation and facilitation of social, cognitive, and kinesthetic performance skills.       Patient Response/Contribution:  positive affect, cooperative with task, organized, socially appropriate, safe use of materials/supplies, listened actively, attentive, and actively engaged     Patient Detail:  Was waiting at the door when writer arrived. Eager to participate in whatever the topic was. Enthusiastic about making a beaded bracelet. Attentive when directions were explained and followed through using good simple planning and organizational skills. Bright, pleasant and social. Requested assistance appropriately when needed. Exhibited patience when peer required assistance taking writer away from helping him. Shared his goals of getting a job,a home of his own and then a girl friend/wife. Task focused x 45 minutes. Thoughts appeared organized and focused.       [id number] OT Group (2 or more in attendance)      Patient Active Problem List   Diagnosis    Unspecified mood (affective) disorder (H24)    Schizophrenia (H)    Bipolar I disorder, most recent episode (or current) manic (H)

## 2024-08-07 NOTE — PROGRESS NOTES
Rehab Group    Start time: 2000  End time: 2100  Patient time total: 45 minutes    attended full group    #1 attended   Group Type: art   Group Topic Covered:     Art Therapy directive was to create a visual self narrative by creating a drawing of self as a landscape. Pts were encouraged to use metaphor to express aspects of self.  Goals of directive:  Emotional expression, to identify personal strengths and goals, to create a visual self narrative, media exploration     Group Session Detail:       Patient Response/Contribution:  cooperative with task       Patient Detail:    Pt was an engaged participant, focused on task for the full duration of group. Pt finished artwork and briefly verbally processed with author and group. Pt created a drawing of a mountain landscape. Pt talked about how spending time in nature is important for pts mental health.  Pts mood was calm, pleasant participant.      No Charge      Patient Active Problem List   Diagnosis    Unspecified mood (affective) disorder (H24)    Schizophrenia (H)    Bipolar I disorder, most recent episode (or current) manic (H)

## 2024-08-07 NOTE — PROGRESS NOTES
Rehab Group    Start time: 1115  End time: 1200  Patient time total: 45 minutes    attended full group    #2 attended   Group Type: general health and coping, relaxation, and recreation   Group Topic Covered: activity therapy, balanced lifestyle, coping skills, healthy leisure time, and social skills       Group Session Detail:  Education was provided regarding the reasoning for utilizing games as therapeutic modalities and the benefits of engagement. Patient actively engaged in therapeutic game in order to: improve social skills, encourage new learning, leisure exploration, exercise cognitive skills, improve concentration, and encourage healthy leisure engagements.      Patient Response/Contribution:  positive affect, cooperative with task, organized, supportive of peers, socially appropriate, safe use of materials/supplies, actively engaged, required prompts or assistance to participate, distracted , and needed prompts to redirect     Patient Detail:  Noted his desire to switch activity from creative expression to leisure skills. Took a leadership role in selecting new activity (visual spatial /cognitive based problem solving game ) and introducing peer to activity. Initially alert and attentive but half way through group needed cuing and/or prompting to turn taking and options for his next move. Appeared distracted/spacing out, decreased energy and interest. Noted he needed to lie down for a little while.      [id number] OT Group (2 or more in attendance)      Patient Active Problem List   Diagnosis    Unspecified mood (affective) disorder (H24)    Schizophrenia (H)    Bipolar I disorder, most recent episode (or current) manic (H)

## 2024-08-07 NOTE — PLAN OF CARE
BEH IP Unit Acuity Rating Score (UARS)  Patient is given one point for every criteria they meet.    CRITERIA SCORING   On a 72 hour hold, court hold, committed, stay of commitment, or revocation. 1    Patient LOS on BEH unit exceeds 20 days. 0  LOS: 9   Patient under guardianship, 55+, otherwise medically complex, or under age 11. 0   Suicide ideation without relief of precipitating factors. 0   Current plan for suicide. 0   Current plan for homicide. 0   Imminent risk or actual attempt to seriously harm another without relief of factors precipitating the attempt. 0   Severe dysfunction in daily living (ex: complete neglect for self care, extreme disruption in vegetative function, extreme deterioration in social interactions). 0   Recent (last 7 days) or current physical aggression in the ED or on unit. 0   Restraints or seclusion episode in past 72 hours. 0   Recent (last 7 days) or current verbal aggression, agitation, yelling, etc., while in the ED or unit. 0   Active psychosis. 0   Need for constant or near constant redirection (from leaving, from others, etc).  0   Intrusive or disruptive behaviors. 0   Patient requires 3 or more hours of individualized nursing care per 8-hour shift (i.e. for ADLs, meds, therapeutic interventions). 0   TOTAL 1

## 2024-08-08 ENCOUNTER — DOCUMENTATION ONLY (OUTPATIENT)
Dept: BEHAVIORAL HEALTH | Facility: CLINIC | Age: 24
End: 2024-08-08
Payer: COMMERCIAL

## 2024-08-08 PROBLEM — Z91.148 NONADHERENCE TO MEDICATION: Status: ACTIVE | Noted: 2024-08-08

## 2024-08-08 PROCEDURE — 97150 GROUP THERAPEUTIC PROCEDURES: CPT | Mod: GO

## 2024-08-08 PROCEDURE — 99232 SBSQ HOSP IP/OBS MODERATE 35: CPT | Performed by: REGISTERED NURSE

## 2024-08-08 PROCEDURE — 124N000002 HC R&B MH UMMC

## 2024-08-08 PROCEDURE — 250N000013 HC RX MED GY IP 250 OP 250 PS 637: Performed by: REGISTERED NURSE

## 2024-08-08 RX ADMIN — NICOTINE POLACRILEX 2 MG: 2 LOZENGE ORAL at 15:05

## 2024-08-08 RX ADMIN — OLANZAPINE 5 MG: 5 TABLET, FILM COATED ORAL at 08:38

## 2024-08-08 RX ADMIN — PRAZOSIN HYDROCHLORIDE 1 MG: 1 CAPSULE ORAL at 20:06

## 2024-08-08 RX ADMIN — OLANZAPINE 15 MG: 15 TABLET, FILM COATED ORAL at 20:06

## 2024-08-08 RX ADMIN — ESCITALOPRAM OXALATE 10 MG: 5 TABLET, FILM COATED ORAL at 08:38

## 2024-08-08 ASSESSMENT — ACTIVITIES OF DAILY LIVING (ADL)
ADLS_ACUITY_SCORE: 28
HYGIENE/GROOMING: INDEPENDENT
HYGIENE/GROOMING: INDEPENDENT
ADLS_ACUITY_SCORE: 28
LAUNDRY: WITH SUPERVISION
ADLS_ACUITY_SCORE: 28
DRESS: SCRUBS (BEHAVIORAL HEALTH);INDEPENDENT
ADLS_ACUITY_SCORE: 28
ORAL_HYGIENE: INDEPENDENT
ORAL_HYGIENE: INDEPENDENT
ADLS_ACUITY_SCORE: 28
ADLS_ACUITY_SCORE: 28
DRESS: INDEPENDENT;SCRUBS (BEHAVIORAL HEALTH)

## 2024-08-08 NOTE — PLAN OF CARE
Problem: Psychotic Signs/Symptoms  Goal: Improved Behavioral Control (Psychotic Signs/Symptoms)  Outcome: Progressing   Goal Outcome Evaluation:    Plan of Care Reviewed With: patient      Pt was visible in the milieu pacing in the hallway and watching tv. Presents with flat affect, and cooperative with cares. He denies pain, SI, HI, SIB and contracts for safety.  Medications compliant, prn nicotine X1. Attended group. Plan of care on going, will continue to monitor.  Vital signs:  Temp: 98.6  F (37  C) Temp src: Temporal BP: (!) 144/78 Pulse: 76   Resp: 16 SpO2: 100 % O2 Device: None (Room air)

## 2024-08-08 NOTE — PLAN OF CARE
Problem: Sleep Disturbance  Goal: Adequate Sleep/Rest  Outcome: Progressing   Goal Outcome Evaluation:    Pt appears to be asleep at start of shift. 15 minutes safety checks done. Pt slept for a total of 6.75 hours.

## 2024-08-08 NOTE — PROGRESS NOTES
Rehab Group    Start time: 1015  End time: 1200  Patient time total: 90 minutes    attended full group    #1 attended   Group Type: OT Clinic   Group Topic Covered: balanced lifestyle, coping skills, emotional regulation, healthy leisure time, and problem solving     Group Session Detail:  Occupational Therapy Clinic group to facilitate coping skill exploration, use of cognitive skills and problem solving, creative expression, clinical observation and facilitation of social, cognitive, and kinesthetic performance skills.       Patient Response/Contribution:  positive affect, cooperative with task, organized, socially appropriate, safe use of materials/supplies, attentive, actively engaged, and engaged socially when prompted     Patient Detail:  Eagerly initiated and actively participated in group. Decisive and exhibited good planning and problem solving while working on a canvas bag. Followed 2-3 step verbal directions. Conversed the entire 90 minutes when subjects were introduced. Futuristic and goal directed. Pleasant and social.          No Charge-not enough group participants to bill      Patient Active Problem List   Diagnosis    Unspecified mood (affective) disorder (H24)    Schizophrenia (H)    Bipolar I disorder, most recent episode (or current) manic (H)    Nonadherence to medication

## 2024-08-08 NOTE — PROGRESS NOTES
Rehab Group    Start time: 1315  End time: 1400  Patient time total: 45 minutes    attended full group     #3 attended   Group Type: general health and coping, life skills, emotion regulation, self care   Group Topic Covered: balanced lifestyle, coping skills, emotional regulation, relaxation , and self-care       Group Session Detail:  Participated in an activity focused on the topic of Affirmations, choosing ones that are helpful in focusing on positive self perspectives and choosing a positive word to focus on for the day and tonight.           Patient Response/Contribution:  positive affect, cooperative with task, organized, socially appropriate, safe use of materials/supplies, actively engaged, engaged socially when prompted, and used humor appropriately     Patient Detail:  Initiated group and was enthusiastic regarding the topic. Designed his affirmations box while discussing his lack of use of affirmations. Attentive to discussion focused on benefits of daily use of affirmations for improved implementation as well as improved motivation and self esteem. Chose to focus on chloe, improved life management for increased independence and achieving goals. Supportive of peers and the affirmations they selected. Bright, pleasant and social.      [id number] OT Group (2 or more in attendance)      Patient Active Problem List   Diagnosis    Unspecified mood (affective) disorder (H24)    Schizophrenia (H)    Bipolar I disorder, most recent episode (or current) manic (H)    Nonadherence to medication

## 2024-08-08 NOTE — PROGRESS NOTES
Prior Authorization **INITIATED**    Medication: OLANZAPINE 5 MG PO TBDP  Insurance Company: [employer] - Phone [phone] Fax [phone]  Pharmacy Filling the Rx: n/a - Patient has not yet been discharged, and there are no outpatient orders for this medication yet  Start Date: 8/8/2024  Reference #: CoverMyMeds Key: [id number] Case ID #: [id number]  Comments:  Proactive Prior Authorization      Hilda Forbes CPhT  Discharge Pharmacy Liaison  [facility]  Pronouns: She/Her/Hers    Securely message with [employer], Epic Secure Chat, or [employer] Vanilla  Phone: [phone]  Fax: [phone]  [email or web address]
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
## 2024-08-08 NOTE — PLAN OF CARE
BEH IP Unit Acuity Rating Score (UARS)  Patient is given one point for every criteria they meet.    CRITERIA SCORING   On a 72 hour hold, court hold, committed, stay of commitment, or revocation. 1    Patient LOS on BEH unit exceeds 20 days. 0  LOS: 10   Patient under guardianship, 55+, otherwise medically complex, or under age 11. 0   Suicide ideation without relief of precipitating factors. 0   Current plan for suicide. 0   Current plan for homicide. 0   Imminent risk or actual attempt to seriously harm another without relief of factors precipitating the attempt. 0   Severe dysfunction in daily living (ex: complete neglect for self care, extreme disruption in vegetative function, extreme deterioration in social interactions). 0   Recent (last 7 days) or current physical aggression in the ED or on unit. 0   Restraints or seclusion episode in past 72 hours. 0   Recent (last 7 days) or current verbal aggression, agitation, yelling, etc., while in the ED or unit. 0   Active psychosis. 0   Need for constant or near constant redirection (from leaving, from others, etc).  0   Intrusive or disruptive behaviors. 0   Patient requires 3 or more hours of individualized nursing care per 8-hour shift (i.e. for ADLs, meds, therapeutic interventions). 0   TOTAL 1

## 2024-08-08 NOTE — PLAN OF CARE
"Group Attendance:  attended full group     Time session began: 1830  Time session ended: 19194  Patient's total time in group: 45     Total # Attendees    3   Group Type psychotherapeutic      Group Topic Covered Treatment compliance: Reasons for treatment, benefits of different kinds of treatments (mental health)      Group Session Detail During this group, participants reviewed the dangers of self-medication and treatment non-adherence. Patients described their understanding of treatment compliance. Group proceeded, at patients' request to address the question \"are all anxiety bad\", and what makes anxiety bad. Reviewed clinical versus sub-clinical criteria for anxiety. Group reviewed how to access support to address anxiety, as well as practical strategies including environmental and cognitive. Feedback was in a supportive stance, where patients validated one another's stories and feelings.       Patient's response to the group topic/interactions:  Cooperative with task, expressed readiness to alter behaviors, and requested more information on topic      Patient Details: Patient attended and stayed the entire group.         [address]355 - Group psychotherapy - 1 Session     Patient Active Problem List   Diagnosis    Unspecified mood (affective) disorder (H24)    Schizophrenia (H)    Bipolar I disorder, most recent episode (or current) manic (H)       "

## 2024-08-08 NOTE — PLAN OF CARE
Upcoming Meetings and Dates/Important Information  Days since IP admission 10  Team Note Due Tuesday  Optometry 08/26/2024 9:20 AM    Next Steps  Check in with group home Monday nicole    Assessment/Intervention/Current Symptoms and Care Coordination  Chart Review  Met with team to discuss patient care.  Received Stayed order for commitment from the court via email Provided copy to pt, placed copy in chart and sent copy to UR.   Pt signed in voluntarily, legal status orders updated  Pt signed ISELA for [place]  Called Terry and scheduled pt for follow up and updated AVS  Called Roxane and coordinated discharge for 8/12. RN was on the line and expressed concern for med adherence. I told her we can do a PA for Zyprexa ODT so the meds dissolve she agreed that would be helpful.  Emailed Santos and Eben with stayed order and discharge instructions as well as confirming discharge, transportation, and pharmacy  Pt's mother left voicemail message requested return call ([phone])  Spoke with pt, he absolutely does not want his mom to have any information.  Called pt's mother, informed her that I cannot confirm or deny if I have any knowledge of the patient.    Discharge Plan or Goal  Dispo Plan: Return to [facility]  Transportation: Santos , will  at 10am.  Medication: [id number] - [place], [place] - [address], PA for Zyprexa ODT  Provisional discharge: Will not need  Safety plan complete?: Not yet completed  Appointments: Completed and on AVS    Barriers to Discharge  patient requires further psychiatric stabilization due to current symptomology, medication management with possible adjustments, commitment petition in process       Expected Discharge Date: 08/12/2024,  1:00 PM      Discharge Comments: , Santos, will .   Instructed him to  at the [facility] and call to unit when arrive.          Referral Status  No new  referral made    Legal Status  Stayed order for MI Commitment  Petition with ila [id number]  Effective 8/6/24  Expires 2/6/2025    Contacts  Primary Care Provider:  No assigned PCP with [facility]  [address] [place] [id number] [phone]     Medication Management/Psychiatry:   Maci Lisa, APRN, CNP, PMHNP-BC with [facility] [address], [address] [place] [address]  Phone [phone] Fax [phone]     :   Santos Smith () [phone]   [facility]  [address] [place] [address]  [email or web address]     CADI :   Teofilo Justin ([phone])   [facility]     Optometry 08/26/2024 9:20 AM  Reyna Calderon, GERALDO with [facility]   [address] [place] [address]   [phone]       Pharmacy   [facility] [place] [facility] [address]  P: [phone] F: [phone]      Mental Health   [facility]  [facility] [address] Intake  P: [phone] F: [phone]   [email or web address]

## 2024-08-08 NOTE — CONSULTS
Consulted to run a test claim for Olanzapine ODT.    Patient has pharmacy benefits through [facility]. Per insurance, this medication is not covered and requires prior authorization.    This process has been started--please see separate notes linked from Prior Authorization Encounter for details/updates regarding this PA.      Please feel free to contact me with any other test claims, prior authorizations, or insurance questions regarding outpatient medications.     Thanks!      Hilda Forbes [facility]  Discharge Pharmacy Liaison  [facility]charge [facility]  Pronouns: She/Her/Hers    Securely message with Vocera, Epic Secure Chat, or [employer]  Phone: [phone]  Fax: [phone]  [email or web address]
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
## 2024-08-09 PROCEDURE — 90834 PSYTX W PT 45 MINUTES: CPT | Performed by: COUNSELOR

## 2024-08-09 PROCEDURE — 250N000013 HC RX MED GY IP 250 OP 250 PS 637: Performed by: REGISTERED NURSE

## 2024-08-09 PROCEDURE — 124N000002 HC R&B MH UMMC

## 2024-08-09 PROCEDURE — 99232 SBSQ HOSP IP/OBS MODERATE 35: CPT | Performed by: REGISTERED NURSE

## 2024-08-09 PROCEDURE — 90853 GROUP PSYCHOTHERAPY: CPT

## 2024-08-09 PROCEDURE — 97150 GROUP THERAPEUTIC PROCEDURES: CPT | Mod: GO

## 2024-08-09 RX ORDER — OLANZAPINE 15 MG/1
15 TABLET, ORALLY DISINTEGRATING ORAL AT BEDTIME
Qty: 30 TABLET | Refills: 0 | Status: SHIPPED | OUTPATIENT
Start: 2024-08-09

## 2024-08-09 RX ORDER — PRAZOSIN HYDROCHLORIDE 1 MG/1
1 CAPSULE ORAL AT BEDTIME
Qty: 30 CAPSULE | Refills: 0 | Status: SHIPPED | OUTPATIENT
Start: 2024-08-09

## 2024-08-09 RX ORDER — OLANZAPINE 5 MG/1
5 TABLET, ORALLY DISINTEGRATING ORAL DAILY
Qty: 30 TABLET | Refills: 0 | Status: SHIPPED | OUTPATIENT
Start: 2024-08-10

## 2024-08-09 RX ORDER — ESCITALOPRAM OXALATE 10 MG/1
10 TABLET ORAL DAILY
Qty: 30 TABLET | Refills: 0 | Status: SHIPPED | OUTPATIENT
Start: 2024-08-09

## 2024-08-09 RX ORDER — OLANZAPINE 5 MG/1
5 TABLET, ORALLY DISINTEGRATING ORAL DAILY
Status: DISCONTINUED | OUTPATIENT
Start: 2024-08-10 | End: 2024-08-12 | Stop reason: HOSPADM

## 2024-08-09 RX ADMIN — PRAZOSIN HYDROCHLORIDE 1 MG: 1 CAPSULE ORAL at 19:51

## 2024-08-09 RX ADMIN — NICOTINE POLACRILEX 2 MG: 2 LOZENGE ORAL at 19:51

## 2024-08-09 RX ADMIN — OLANZAPINE 5 MG: 5 TABLET, FILM COATED ORAL at 08:09

## 2024-08-09 RX ADMIN — ESCITALOPRAM OXALATE 10 MG: 5 TABLET, FILM COATED ORAL at 08:09

## 2024-08-09 RX ADMIN — OLANZAPINE 15 MG: 10 TABLET, ORALLY DISINTEGRATING ORAL at 19:51

## 2024-08-09 ASSESSMENT — ACTIVITIES OF DAILY LIVING (ADL)
ADLS_ACUITY_SCORE: 28

## 2024-08-09 NOTE — PROGRESS NOTES
Prior Authorization **APPROVED**    Authorization Effective Date: 8/8/2024  Authorization Expiration Date: 8/8/2025  Medication: OLANZAPINE 5 MG PO TBDP  Approved Dose/Quantity: 1 tablet daily  Reference #: CoverMyMeds Key: [id number] PA Case ID #: [id number]   Insurance Company: [employer] - Phone [phone] Fax [phone]  Expected CoPay: $ 0    CoPay Card Available: No    Foundation Assistance Needed: -  Which Pharmacy is filling the prescription (Not needed for infusion/clinic administered): n/a - Patient has not yet been discharged, and there are no outpatient orders for this medication yet  Comments:  Proactive Prior Authorization. Approved for one tab daily on all strengths.          Hilda Forbes [facility]  Discharge Pharmacy Liaison  [facility] Discharge Pharm[facility]  Pronouns: She/Her/Hers    Securely message with [employer], Epic Secure Chat, or [employer]  Phone: [phone]  Fax: [phone]  [email or web address]

## 2024-08-09 NOTE — PROGRESS NOTES
Rehab Group    Start time: 915  End time: 1100  Patient time total: 45 minutes    attended full group     #3 attended   Group Type: OT Clinic   Group Topic Covered: balanced lifestyle, coping skills, emotional regulation, healthy leisure time, and problem solving     Group Session Detail:  Occupational Therapy Clinic group to facilitate coping skill exploration, use of cognitive skills and problem solving, creative expression, clinical observation and facilitation of social, cognitive, and kinesthetic performance skills.       Patient Response/Contribution:  cooperative with task, safe use of materials/supplies, actively engaged, engaged socially when prompted, distracted , inattentive, and did not share thoughts verbally     Patient Detail:  Initiated group eager to work on creative task he selected . Following 2 step verbal directions independently followed through. Worked quickly but had subpar workmanship. Appeared distracted as he did not engage in group conversation and needed questions asked twice before responding.  Asked to leave when he completed his task where as he in past has sought out another task for duration of group.       [id number] OT Group (2 or more in attendance)      Patient Active Problem List   Diagnosis    Unspecified mood (affective) disorder (H24)    Schizophrenia (H)    Bipolar I disorder, most recent episode (or current) manic (H)    Nonadherence to medication
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
## 2024-08-09 NOTE — PLAN OF CARE
"Goal Outcome Evaluation:    Individual Therapy Note      Date of Service: August 9, 2024    Patient: Jw goes by \"Jw,\" uses he/him pronouns    Individuals Present: EDGAR Licea    Session start: 2:15 pm  Session end: 3:00 pm  Session duration in minutes: 45      Modality Used: Person Centered and Positive Psychology- growth Mindset    Goals: was going to be group but he was the only one in attendance, 1:1 [facility] hospital; stay and upcoming discharge on Monday and how he naturally utilizes a growth mindset and independence in his life.    Patient Description of current symptoms: 0 of 10 depression  0 of 10 anxiety  10 of 10 chloe reported     Mental Status Exam:   Attitude: cooperative, evasive, and guarded  Eye Contact: good  Mood: better  Affect: appropriate and in normal range  Speech: clear, coherent  Psychomotor Behavior: no evidence of tardive dyskinesia, dystonia, or tics  Thought Process:  goal oriented  Associations: no loose associations  Thought Content: no evidence of suicidal ideation or homicidal ideation  Insight: fair  Judgement: fair  Attention Span and Concentration: intact    Pt progress: Pt is doing well, ready to return to his group home and find a job. He is saving for a small house for himself.    Treatment Objective(s) Addressed:   The focus of this session was on processing feelings related to discharge monday, identifying an appropriate aftercare plan, building self-esteem, and building skills in growth mindset      Progress Towards Goals and Assessment of Patient:   Patient is making progress towards treatment goals as evidenced by narrative he is ready to go but appreciated the connections and care here.       Therapeutic Intervention(s):   Provided active listening, unconditional positive regard, and validation.   Provided positive reinforcement for progress towards goals, gains in knowledge, and application of skills previously taught, Identified " and practiced coping skills, and Reviewed healthy living that supports positive mental health, including looking at sleep hygiene, regular movement, nutrition, and regular socialization    Plan/next step: discharge Monday [address] - Psychotherapy (with patient) - 45 (38-52*) min    Patient Active Problem List   Diagnosis    Unspecified mood (affective) disorder (H24)    Schizophrenia (H)    Bipolar I disorder, most recent episode (or current) manic (H)    Nonadherence to medication

## 2024-08-09 NOTE — PLAN OF CARE
Upcoming Meetings and Dates/Important Information  Days since IP admission 11  Team Note Due Tuesday  Optometry 08/26/2024 9:20 AM    Next Steps  Check in with group home Monday nicole    Assessment/Intervention/Current Symptoms and Care Coordination  Chart Review    Met with team to discuss patient care.    Scheduled PCP appointment. AVS is completed       Discharge Plan or Goal  [facility]: [facility]  Transportation: [place], , will  at 1:00PM  Medication: [id number] - [place], [place] - [address], [place] for Zyprexa ODT  Provisional discharge: Will not need  Safety plan complete?: Not yet completed  Appointments: Completed and on AVS    Barriers to Discharge  patient requires further psychiatric stabilization due to current symptomology, medication management with possible adjustments, commitment petition in process           Referral Status  No new referral made    Legal Status  Stayed order for MI Commitment  Petition with ila [id number]  Effective 8/6/24  Expires 2/6/2025    Contacts  Primary Care Provider:  No assigned PCP with [facility]  [address] [place] [id number] [phone]     Medication Management/Psychiatry:   Maci Lisa APRN, CNP, PMHNP-BC with [facility] [address], [address] [place] [address]  Phone [phone] Fax [phone]     :   Santos Smith () [phone]   [address] [address]  [email or web address]     CADI :   Teofilo Justin ([phone])   [facility]     Optometry 08/26/2024 9:20 AM  Reyna Calderon OD with [facility] [place] [place]   [address] [place] [address] [phone]       Pharmacy   [id number] - [place], [place] - [address]  P: [phone] F: [phone]      Mental Health Case  Manager  Eben Spain, YAW  [facility] [address] Intake  P: [phone] F: [phone]   [email or web address]             n/a

## 2024-08-09 NOTE — PLAN OF CARE
Goal Outcome Evaluation:    Plan of Care Reviewed With: patient        Problem: Psychotic Signs/Symptoms  Goal: Improved Mood Symptoms (Psychotic Signs/Symptoms)  Outcome: Progressing  Intervention: Optimize Emotion and Mood  Recent Flowsheet Documentation  Taken 8/8/2024 1900 by Adelina Bhagat RN  Diversional Activity:   music   television       Patient was visible in the milieu. Observed sitting in the lounge and watching TV. He was also pacing in the hallway with headphones on. He laughed loudly when he talked to staff this evening. Denied all mental health symptoms. Pleasant when approached. Declined to attend group. Med compliant. Cooperative with mouth check. No physical problems reported so far. Will continue to monitor.

## 2024-08-09 NOTE — PLAN OF CARE
Problem: Psychotic Signs/Symptoms  Goal: Improved Behavioral Control (Psychotic Signs/Symptoms)  Outcome: Progressing   Goal Outcome Evaluation:    Pt was visible in the milieu pacing in the hallway and playing X box with peer. He presented with bright affect upon approach. He denies pain and all MH symptoms. He voiced looking forward to his discharge on Monday. Ate and drinking well. Attended group. Medication compliant. Plan of care on going, will continue to monitor.   Vital signs:  Temp: 97.6  F (36.4  C) Temp src: Temporal BP: (!) 144/84 Pulse: 88   Resp: 16 SpO2: 98 % O2 Device: None (Room air)

## 2024-08-09 NOTE — PLAN OF CARE
Goal Outcome Evaluation:      Problem: Adult Behavioral Health Plan of Care  Goal: Absence of New-Onset Illness or Injury  Outcome: Progressing     Problem: Psychotic Signs/Symptoms  Goal: Improved Behavioral Control (Psychotic Signs/Symptoms)  Outcome: Progressing     Problem: Psychotic Signs/Symptoms  Goal: Optimal Cognitive Function (Psychotic Signs/Symptoms)  Outcome: Progressing         Pt is pleasant, cooperative, engaged in activities. Compliant with meds. Pt is optimistic about his discharge on Monday.    Dixie Chatman RN

## 2024-08-09 NOTE — PLAN OF CARE
Goal Outcome Evaluation:             Pt appeared to sleep 6.75 hours during the night, no problems reported, no PRN's given, on Status 15 minute safety checks.

## 2024-08-09 NOTE — PLAN OF CARE
BEH IP Unit Acuity Rating Score (UARS)  Patient is given one point for every criteria they meet.    CRITERIA SCORING   On a 72 hour hold, court hold, committed, stay of commitment, or revocation. 1    Patient LOS on BEH unit exceeds 20 days. 0  LOS: 11   Patient under guardianship, 55+, otherwise medically complex, or under age 11. 0   Suicide ideation without relief of precipitating factors. 0   Current plan for suicide. 0   Current plan for homicide. 0   Imminent risk or actual attempt to seriously harm another without relief of factors precipitating the attempt. 0   Severe dysfunction in daily living (ex: complete neglect for self care, extreme disruption in vegetative function, extreme deterioration in social interactions). 0   Recent (last 7 days) or current physical aggression in the ED or on unit. 0   Restraints or seclusion episode in past 72 hours. 0   Recent (last 7 days) or current verbal aggression, agitation, yelling, etc., while in the ED or unit. 0   Active psychosis. 0   Need for constant or near constant redirection (from leaving, from others, etc).  0   Intrusive or disruptive behaviors. 0   Patient requires 3 or more hours of individualized nursing care per 8-hour shift (i.e. for ADLs, meds, therapeutic interventions). 0   TOTAL 1

## 2024-08-09 NOTE — PLAN OF CARE
Upcoming Meetings and Dates/Important Information  Days since IP admission 11  Team Note Due Tuesday  Optometry 08/26/2024 9:20 AM    Next Steps  Check in with group home Monday nicole    Assessment/Intervention/Current Symptoms and Care Coordination  Chart Review    Met with team to discuss patient care.    Scheduled PCP appointment. AVS is completed     Order of stay of commitment received. Pt given copy and copy placed in his chart       Discharge Plan or Goal  [facility]: [facility]  Transportation: [place] , will  at 1:00PM  Medication: [id number] - [place], [place] - [address], [place] for Zyprexa ODT  Provisional discharge: Will not need  Safety plan complete?: Not yet completed  Appointments: Completed and on AVS    Barriers to Discharge  patient requires further psychiatric stabilization due to current symptomology, medication management with possible adjustments, commitment petition in process           Referral Status  No new referral made    Legal Status  Stayed order for MI Commitment  Petition with thorpe [id number]  Effective 8/6/24  Expires 2/6/2025    Contacts  Primary Care Provider:  No assigned PCP with [facility]  [address] [place] [address] [phone]     Medication Management/Psychiatry:   NARAYAN Mueller, CNP, PMHNP-BC with [facility] [address] [place] [address]  Phone [phone] Fax [phone]     :   Santos Smith () [phone]   [facility]  [address] [place] [id number]  [email or web address]     CADI :   Teofilo Justin ([phone])   [facility]     Optometry 08/26/2024 9:20 AM  Reyna Calderon OD [facility]   [address] [place], [place] [id number] [phone]       Pharmacy   [facility] - [place], [place] - [address]  P: [phone] F: [phone]      Mental Health   YAW Oden  [facility] [address] Intake  P: [phone] F: [phone]   [email or web address]

## 2024-08-10 PROCEDURE — 124N000002 HC R&B MH UMMC

## 2024-08-10 PROCEDURE — 250N000013 HC RX MED GY IP 250 OP 250 PS 637: Performed by: REGISTERED NURSE

## 2024-08-10 RX ADMIN — ESCITALOPRAM OXALATE 10 MG: 5 TABLET, FILM COATED ORAL at 08:38

## 2024-08-10 RX ADMIN — OLANZAPINE 5 MG: 5 TABLET, ORALLY DISINTEGRATING ORAL at 08:38

## 2024-08-10 RX ADMIN — PRAZOSIN HYDROCHLORIDE 1 MG: 1 CAPSULE ORAL at 20:20

## 2024-08-10 RX ADMIN — OLANZAPINE 15 MG: 10 TABLET, ORALLY DISINTEGRATING ORAL at 20:20

## 2024-08-10 ASSESSMENT — ACTIVITIES OF DAILY LIVING (ADL)
ADLS_ACUITY_SCORE: 28

## 2024-08-10 NOTE — PLAN OF CARE
Goal Outcome Evaluation:       Pt appeared to sleep 7 hours during the night, no problems reported, no PRN's given on Status 15 minute safety checks.

## 2024-08-10 NOTE — PLAN OF CARE
Problem: Adult Behavioral Health Plan of Care  Goal: Optimized Coping Skills in Response to Life Stressors  Outcome: Progressing     Problem: Psychotic Signs/Symptoms  Goal: Enhanced Social, Occupational or Functional Skills (Psychotic Signs/Symptoms)  Outcome: Progressing     Problem: Sleep Disturbance  Goal: Adequate Sleep/Rest  Outcome: Progressing       Pt is pleasant, upbeat and present in milieu in morning. Pt is compliant with morning meds. He denies pain. He looks forward to discharging on Monday. Pt does hallway bowling with a peer in late morning. Pt is more isolative in afternoon--resting in his room.    Dixie Chatman RN

## 2024-08-10 NOTE — PLAN OF CARE
Group Attendance:  attended full group    Time session began: 2000  Time session ended: 2045  Patient's total time in group: 45    Total # Attendees   2   Group Type psychotherapeutic     Group Topic Covered insight improvement and relationships and boundaries     Group Session Detail Group members checked in with how they are feeling and a success for the day. The group discussed trust, what it means and how it shows up in their lives. Members discussed the Acronym BRAVING by Ilda Cronin and had a discussion about the parts of trust show up for them, what they could work on, how to incorporate self-trust, and what they could incorporate in their lives around trust.      Patient's response to the group topic/interactions:  cooperative with task, organized, supportive of peers, socially appropriate, safe use of materials/supplies, listened actively, expressed understanding of topic, and actively engaged     Patient Details: Pt attended group and was engaged in the conversation and took a worksheet with him at the end of the group. Pt was supportive of peers.            [address] - Group psychotherapy - 1 Session    Patient Active Problem List   Diagnosis    Unspecified mood (affective) disorder (H24)    Schizophrenia (H)    Bipolar I disorder, most recent episode (or current) manic (H)    Nonadherence to medication

## 2024-08-11 PROCEDURE — 124N000002 HC R&B MH UMMC

## 2024-08-11 PROCEDURE — 250N000013 HC RX MED GY IP 250 OP 250 PS 637: Performed by: REGISTERED NURSE

## 2024-08-11 RX ADMIN — ESCITALOPRAM OXALATE 10 MG: 5 TABLET, FILM COATED ORAL at 08:26

## 2024-08-11 RX ADMIN — NICOTINE POLACRILEX 2 MG: 2 LOZENGE ORAL at 20:08

## 2024-08-11 RX ADMIN — OLANZAPINE 15 MG: 10 TABLET, ORALLY DISINTEGRATING ORAL at 20:08

## 2024-08-11 RX ADMIN — PRAZOSIN HYDROCHLORIDE 1 MG: 1 CAPSULE ORAL at 20:08

## 2024-08-11 RX ADMIN — OLANZAPINE 5 MG: 5 TABLET, ORALLY DISINTEGRATING ORAL at 08:26

## 2024-08-11 ASSESSMENT — ACTIVITIES OF DAILY LIVING (ADL)
ADLS_ACUITY_SCORE: 28
LAUNDRY: WITH SUPERVISION
ADLS_ACUITY_SCORE: 28
DRESS: SCRUBS (BEHAVIORAL HEALTH)
ADLS_ACUITY_SCORE: 28
ORAL_HYGIENE: INDEPENDENT
ADLS_ACUITY_SCORE: 28
ADLS_ACUITY_SCORE: 28
HYGIENE/GROOMING: INDEPENDENT
ADLS_ACUITY_SCORE: 28

## 2024-08-11 NOTE — PLAN OF CARE
"  Problem: Psychotic Signs/Symptoms  Goal: Improved Behavioral Control (Psychotic Signs/Symptoms)  Outcome: Progressing   Goal Outcome Evaluation:    Plan of Care Reviewed With: patient      Pt was napping at the start of the shift. Visible in the milieu pacing in the hallway listening to headphones. He presented with bright affect upon approach. He denies pain and all MH symptoms, voiced looking forward  discharge on Monday. Ate and drinking well. Attended group. Medication compliant. Plan of care on going, will continue to monitor     Vital signs:  Temp: 97.4  F (36.3  C) Temp src: Temporal BP: 126/83 Pulse: 82   Resp: 14 SpO2: 98 % O2 Device: None (Room air)   Height: 167.6 cm (5' 6\") Weight: 63.8 kg (140 lb 11.2 oz)  "

## 2024-08-11 NOTE — PLAN OF CARE
"Goal Outcome Evaluation:    Plan of Care Reviewed With: patient      Patient was up at around 8 AM and reports having slept \"pretty good\".  He denies any pain or discomforts, medication compliant.  He ate 100% of meals.  Denies SI, HI, SIBI and hallucinations.  No PRN's given this shift.  He is quite pleasant, upbeat and was out in the milieu most of the morning.  He is looking forward to discharging tomorrow - Monday planned for 1:00 pm.  He is more isolative in afternoon and spent most of the shift following lunch resting in his room.  Patient contracts for safety and agrees to inform staff if he is experiencing any discomforts.  /86 (BP Location: Right arm)   Pulse 69   Temp 97.8  F (36.6  C) (Temporal)   Resp 16   Ht 1.676 m (5' 6\")   Wt 63.8 kg (140 lb 11.2 oz)   SpO2 97%   BMI 22.71 kg/m           "

## 2024-08-11 NOTE — PLAN OF CARE
Group Attendance:  attended full group    Time session began: 1900   Time session ended: 1935  Patient's total time in group: 35    Total # Attendees   1   Group Type psychotherapeutic and DBT     Group Topic Covered emotional regulation, insight improvement, symptom management, incorporating skill sets, healthy coping skills, and DBT skills: TIPP     Group Session Detail Group members checked in with how they are feeling and had time for therapeutic processing. Pts then reviewed and practiced DBT skill TIPP, Temperature change, intense exercise, paced breathing, progressive muscle relaxation     Patient's response to the group topic/interactions:  cooperative with task, organized, listened actively, expressed understanding of topic, attentive, and actively engaged     Patient Details: Pt attended group and was open to learning and practicing the TIPP skill. Pt was more closed off when it came to talking about what himself. Pt expressed liking the different skills in TIPP.            No Charge (0-15 min) Only 1 participant    Patient Active Problem List   Diagnosis    Unspecified mood (affective) disorder (H24)    Schizophrenia (H)    Bipolar I disorder, most recent episode (or current) manic (H)    Nonadherence to medication

## 2024-08-11 NOTE — PLAN OF CARE
Goal Outcome Evaluation:       Pt appeared to sleep 7 hours during the night, no problems reported, no PRN's given, on Status 15 minute safety checks.

## 2024-08-12 VITALS
HEIGHT: 66 IN | BODY MASS INDEX: 22.61 KG/M2 | WEIGHT: 140.7 LBS | HEART RATE: 72 BPM | OXYGEN SATURATION: 99 % | SYSTOLIC BLOOD PRESSURE: 134 MMHG | RESPIRATION RATE: 16 BRPM | TEMPERATURE: 97.9 F | DIASTOLIC BLOOD PRESSURE: 86 MMHG

## 2024-08-12 PROCEDURE — 97150 GROUP THERAPEUTIC PROCEDURES: CPT | Mod: GO

## 2024-08-12 PROCEDURE — 250N000013 HC RX MED GY IP 250 OP 250 PS 637: Performed by: REGISTERED NURSE

## 2024-08-12 PROCEDURE — 99239 HOSP IP/OBS DSCHRG MGMT >30: CPT | Performed by: REGISTERED NURSE

## 2024-08-12 RX ADMIN — ESCITALOPRAM OXALATE 10 MG: 5 TABLET, FILM COATED ORAL at 07:58

## 2024-08-12 RX ADMIN — OLANZAPINE 5 MG: 5 TABLET, ORALLY DISINTEGRATING ORAL at 07:58

## 2024-08-12 ASSESSMENT — ACTIVITIES OF DAILY LIVING (ADL)
ADLS_ACUITY_SCORE: 28
HYGIENE/GROOMING: INDEPENDENT
ADLS_ACUITY_SCORE: 28
LAUNDRY: WITH SUPERVISION
ADLS_ACUITY_SCORE: 28
ORAL_HYGIENE: INDEPENDENT
ADLS_ACUITY_SCORE: 28
ADLS_ACUITY_SCORE: 28
DRESS: INDEPENDENT

## 2024-08-12 NOTE — PLAN OF CARE
Upcoming Meetings and Dates/Important Information  Days since IP admission 14  Team Note Due Tuesday  Optometry 08/26/2024 9:20 AM    Next Steps  Check in with group home Monday nicole    Assessment/Intervention/Current Symptoms and Care Coordination  Chart Review    Met with team to discuss patient care.    Confirmed transportation at 1PM with  ( Santos Smith [phone] ). Provided updates and phone number to call when he arrives.         Discharge Plan or Goal  Dispo Plan: Return to [facility]  Transportation: Santos , will  at 1:00PM  Medication: [facility] [id number] - [place], [place] - [address], [place] for Zyprexa ODT  Provisional discharge: Will not need  Safety plan complete?: Not yet completed  Appointments: Completed and on AVS    Barriers to Discharge  patient requires further psychiatric stabilization due to current symptomology, medication management with possible adjustments, commitment petition in process           Referral Status  No new referral made    Legal Status  Stayed order for MI Commitment  Petition with ila [id number]  Effective 8/6/24  Expires 2/6/2025    Contacts  Primary Care Provider:  No assigned PCP with [facility]  [address] [place] [address] [phone]     Medication Management/Psychiatry:   NARAYAN Mueller, CNP, PMHNP-BC with [facility] [address], [address] [place] [address]  Phone [phone] Fax [phone]     :   Santos Smith () [phone]   [facility]  [address] [place] [id number]  [email or web address]     CADI :   Teofilo Justin ([phone])   [facility]     Optometry 08/26/2024 9:20 AM  Reyna Calderon, GERALDO with [facility]   [address] [place] [address] [phone]       Pharmacy   [facility] [place] [facility]  [place], [place] - [address]  P: [phone] F: [phone]      Mental Health   Eben [facility] [address] Intake  P: [phone] F: [phone]   [email or web address]

## 2024-08-12 NOTE — PLAN OF CARE
"  Problem: Adult Behavioral Health Plan of Care  Goal: Plan of Care Review  Outcome: Adequate for Care Transition  Goal: Patient-Specific Goal (Individualization)  Description: You can add care plan individualizations to a care plan. Examples of Individualization might be:  \"Parent requests to be called daily at 9am for status\", \"I have a hard time hearing out of my right ear\", or \"Do not touch me to wake me up as it startles  me\".  Outcome: Adequate for Care Transition  Goal: Adheres to Safety Considerations for Self and Others  Outcome: Adequate for Care Transition  Intervention: Develop and Maintain Individualized Safety Plan  Recent Flowsheet Documentation  Taken 8/12/2024 1057 by Nicci Benson RN  Safety Measures: suicide check-in completed  Taken 8/12/2024 1000 by Nicci Benson RN  Safety Measures:   environmental rounds completed   safety rounds completed  Goal: Absence of New-Onset Illness or Injury  Outcome: Adequate for Care Transition  Intervention: Identify and Manage Fall Risk  Recent Flowsheet Documentation  Taken 8/12/2024 1057 by Nicci Benson RN  Safety Measures: suicide check-in completed  Taken 8/12/2024 1000 by Nicci Benson RN  Safety Measures:   environmental rounds completed   safety rounds completed  Goal: Optimized Coping Skills in Response to Life Stressors  Outcome: Adequate for Care Transition  Intervention: Promote Effective Coping Strategies  Recent Flowsheet Documentation  Taken 8/12/2024 1000 by Nicci Benson RN  Supportive Measures:   active listening utilized   positive reinforcement provided   verbalization of feelings encouraged  Goal: Develops/Participates in Therapeutic Edroy to Support Successful Transition  Outcome: Adequate for Care Transition  Intervention: Foster Therapeutic Edroy  Recent Flowsheet Documentation  Taken 8/12/2024 1000 by Nicci Benson RN  Trust Relationship/Rapport:   care explained   choices provided   " questions answered   questions encouraged   reassurance provided   thoughts/feelings acknowledged   emotional support provided     Problem: Psychotic Signs/Symptoms  Goal: Improved Behavioral Control (Psychotic Signs/Symptoms)  Outcome: Adequate for Care Transition  Goal: Optimal Cognitive Function (Psychotic Signs/Symptoms)  Outcome: Adequate for Care Transition  Intervention: Support and Promote Cognitive Ability  Recent Flowsheet Documentation  Taken 8/12/2024 1000 by Nicci Benson RN  Trust Relationship/Rapport:   care explained   choices provided   questions answered   questions encouraged   reassurance provided   thoughts/feelings acknowledged   emotional support provided  Goal: Increased Participation and Engagement (Psychotic Signs/Symptoms)  Outcome: Adequate for Care Transition  Intervention: Facilitate Participation and Engagement  Recent Flowsheet Documentation  Taken 8/12/2024 1000 by Nicci Benson RN  Supportive Measures:   active listening utilized   positive reinforcement provided   verbalization of feelings encouraged  Diversional Activity:   music   play  Goal: Improved Mood Symptoms (Psychotic Signs/Symptoms)  Outcome: Adequate for Care Transition  Intervention: Optimize Emotion and Mood  Recent Flowsheet Documentation  Taken 8/12/2024 1000 by Nicci Benson RN  Supportive Measures:   active listening utilized   positive reinforcement provided   verbalization of feelings encouraged  Diversional Activity:   music   play  Goal: Improved Psychomotor Symptoms (Psychotic Signs/Symptoms)  Outcome: Adequate for Care Transition  Intervention: Manage Psychomotor Movement  Recent Flowsheet Documentation  Taken 8/12/2024 1000 by Nicci Benson RN  Patient Performed Hygiene: shower  Diversional Activity:   music   play  Activity (Behavioral Health): up ad ronda  Goal: Decreased Sensory Symptoms (Psychotic Signs/Symptoms)  Outcome: Adequate for Care Transition  Goal: Improved Sleep  (Psychotic Signs/Symptoms)  Outcome: Adequate for Care Transition  Goal: Enhanced Social, Occupational or Functional Skills (Psychotic Signs/Symptoms)  Outcome: Adequate for Care Transition  Intervention: Promote Social, Occupational and Functional Ability  Recent Flowsheet Documentation  Taken 8/12/2024 1000 by Nicci Benson RN  Trust Relationship/Rapport:   care explained   choices provided   questions answered   questions encouraged   reassurance provided   thoughts/feelings acknowledged   emotional support provided     Problem: Sleep Disturbance  Goal: Adequate Sleep/Rest  Outcome: Adequate for Care Transition   Goal Outcome Evaluation:    The AVS was verbally reviewed with the patient and a copy was given to him to take at time of discharge from the unit. All ordered medications were reviewed with the patient including name, dose, when and how to take them. Patient verbalized understanding of ordered medications. Patient was informed of what pharmacy his medications were ordered and to pick them up. Patient verbalized understanding of where to get his medications. All follow up appointments that are scheduled were reviewed with the patient including when and how to cancel them ahead of time if unable to attend them. Patient verbalized understanding of follow up appointments and when and how to cancel them if needed. All belongings the patient had at the hospital were returned to him including belongings stored in a safe in the security office. Patient was instructed to call 911 and/or to go to the Emergency Room if he has thoughts of SIB/SI and/or HI after discharge from the hospital. Patient verbalized understanding of what to do if he feels unsafe after discharge. Patient denied SIB, SI and HI prior to discharge from the hospital today. Patient is being picked up by the group home he is discharging to from the hospital. Patient left the unit at 1229. Staff walked with him to the ground floor North  Building entrance where he was picked up by staff from the group home he is discharged to and transported him there via car.

## 2024-08-12 NOTE — PROGRESS NOTES
Rehab Group    Start time: 1020  End time: 1215  Patient time total: 90 minutes    attended full group    #5 attended   Group Type: occupational therapy   Group Topic Covered: coping skills     Group Session Detail:  OT clinic     Patient Response/Contribution:  cooperative with task, organized, attentive, and actively engaged     Patient Detail:    Pt actively participated in occupational therapy clinic to facilitate coping skill exploration, creative expression within personally meaningful activities, and clinical observation of social, cognitive, and kinesthetic performance skills. Pt response: Independent to initiate, gather materials, sequence, and adjust to workspace demands as needed. Demonstrated good focus, planning, and attention to detail for selected creative expression task. Able to ask for assistance as needed, and socialized with peers and staff. Future-oriented in discussing upcoming discharge.      [id number] OT Group (2 or more in attendance)      Patient Active Problem List   Diagnosis    Unspecified mood (affective) disorder (H24)    Schizophrenia (H)    Bipolar I disorder, most recent episode (or current) manic (H)    Nonadherence to medication

## 2024-08-12 NOTE — PLAN OF CARE
Problem: Psychotic Signs/Symptoms  Goal: Improved Behavioral Control (Psychotic Signs/Symptoms)  Outcome: Progressing   Goal Outcome Evaluation:    Plan of Care Reviewed With: patient      Pt was visible in the milieu pacing in the hallway listening to headphones and watching tv. He presented with bright affect upon approach, cooperative with care. He denies pain and all MH symptoms. Ate and drinking well. Medication compliant, prn nicotine lozenge. Pt discharge tomorrow. Plan of care on going, will continue to monitor.    Vital signs:  Temp: 98.5  F (36.9  C) Temp src: Temporal BP: 133/85 Pulse: 72   Resp: 16 SpO2: 99 % O2 Device: None (Room air)

## 2024-08-12 NOTE — PLAN OF CARE
BEH IP Unit Acuity Rating Score (UARS)  Patient is given one point for every criteria they meet.    CRITERIA SCORING   On a 72 hour hold, court hold, committed, stay of commitment, or revocation. 1    Patient LOS on BEH unit exceeds 20 days. 0  LOS: 14   Patient under guardianship, 55+, otherwise medically complex, or under age 11. 0   Suicide ideation without relief of precipitating factors. 0   Current plan for suicide. 0   Current plan for homicide. 0   Imminent risk or actual attempt to seriously harm another without relief of factors precipitating the attempt. 0   Severe dysfunction in daily living (ex: complete neglect for self care, extreme disruption in vegetative function, extreme deterioration in social interactions). 0   Recent (last 7 days) or current physical aggression in the ED or on unit. 0   Restraints or seclusion episode in past 72 hours. 0   Recent (last 7 days) or current verbal aggression, agitation, yelling, etc., while in the ED or unit. 0   Active psychosis. 0   Need for constant or near constant redirection (from leaving, from others, etc).  0   Intrusive or disruptive behaviors. 0   Patient requires 3 or more hours of individualized nursing care per 8-hour shift (i.e. for ADLs, meds, therapeutic interventions). 0   TOTAL 1

## 2024-08-12 NOTE — DISCHARGE SUMMARY
Patient: Leatha Leal Bed: /A Length of Stay:  LOS: 0 days     [facility] ([facility] - Neurocritical Care    CC: seizures  Primary Diagnosis: Status Epilepticus  HPI Summary:  70 yr old with Sz hx now in Status    Subjective, Overnight events and Hospital Course  5/25 - unable to get subjective report    Assessment and Plan/Decision Making:  Neurological:   Seizure   Altered mental status  Multiple sclerosis  Depression  Vit B Def  Neuralgia  - Cont Vimpat  - Start Ketamine infusion  - Neurochecks Q2Hr Day shift and Q2Hr night shift   Goals:     Na goal 135 - 145    BP goal  MAP > 65 and SBP < 150     Pain    Minimize pain meds    PT/OT/ST  Initiated   DVT Prophylaxis SCDs and Pharamcological Prophlaxis   Cardiovascular:    Hypotension    - MAP goal >60   - Vasopressor as needed    - SBP < 150   - PRNs ordered  Renal:  No active issues  Respiratory No active issues  GI/Endo    Hyperglycemia (Present on admission)   - SSI  Hematology No active issues  Infectious  No active issues  Skin  No active Issues  Lines  PIVs Only  Disposition Continue ICU care  [facility]Care DNR MMS  Code Status  No Resuscitation with Maximal Medical Support     This patient is critically ill due to acute impairment of the multiple system as a result of status epilepticus with high probability of imminent or life threatening deterioration in the patient’s condition that requires ICU management.     The care that I provided includes detailed clinical assessment, interpretation of multiple physiological parameters, high complexity decision making to assess, manipulate, and support vital system function to treat multi system failure and to prevent further life threatening deterioration of the patient's condition.     I provided 60 minutes of Critical Care to this patient. This included review of recent events, clinical examination and review of data on multiple occasions, management of multiple organ systems, and discussion  Psychiatric Discharge Summary    Jw Broderick MRN# [id number]   Age: 24 year old YOB: 2000     Date of Admission:  7/29/2024  Date of Discharge:  8/12/2024  Admitting Physician:  Jamie Rob MD  Discharge Physician:  NARAYAN Sales CNP     Event Leading to Hospitalization:     Per ED Provider Note on 7/25/2024:   Jw Broderick is a 24 year old male with a history of schizophrenia and bipolar disorder who presents to ED from his group home for evaluation of agitation. According to patient, he was doing acrobatic back flips and cartwheels and was bumping into the wall and furniture in his room. This was causing loud sounds and the staff at his group home were concerned. He denies feeling agitated, depressed, or anxious. He denies SI/HI ideations. He denies intentional disruptive behavior.      According to group [place] staff who is at bedside, patient has been more agitated lately. He is engaging in destructive behaviors and has been destroying the furniture and walls of his room. He has also been more angry and depressed. He has also been intentionally fasting and has overall declining health due to severe depression.      Per Psychiatry Consult Note on 7/29/2024:   Jw Broderick is a 24 year old male with past psychiatric history of bipolar vs schizophrenia with a history of civil commitment in 2021 and no remarkable past medical history who was brought to [facility] ED on 7/25 from group home (brought in by group home personnel) for increasing physical aggression and hallucinations. CBC and CMP remarkable for Tbili 1.8. UDS pending and treatment course has included Zyprexa 5mg qAM and 15mg qPM, Lexapro 10mg, and Minipress 1mg qPM to which patient has been compliant. Psychiatry consulted to evaluate psychosis and HI.     Per DEC assessment review, group home personnel state that the patient is having more hallucinations, becoming more destructive, and making  "homicidal comments to staff members in part seemingly as a response to hallucinations. He has been making homicidal threats to kill his family as he believes his family stole $20,000 from him. Patient is also paranoid that people are coming to kill him. Patient has been engaging in high risk behavior, including bringing homeless folks in the group home and he has stolen a car. Refusing to attend psychiatry appointments. They are concerned he is noncompliant with his medications and think he is flushing it down the toilet. He has also been intentionally fasting and has had significant weight loss over the last 3 months.       On interview, patient states that he is good. States he was brought in as he was doing back flips in his room and put a hole in his wall, which group home staff misinterpreted as aggressive behavior. States he does this for fun. Denies ever refusing medication or missing appointments. When interview mentions patient endorsing HI towards family regarding stolen money, patient states \"no, woah, that's really weird\". States he is on olanzapine because he is required to take it - is not able to articulate why. States he wants to leave group home as it smells of urine. Denies history of psychiatric illness such as schizophrenia, bipolar disorder, etc. Denies SI, SA, HI, AH, VH, delusions of persecution, nightmares, and physical aggression towards group home staff/family. When asked to describe his upbringing and past jobs he appeared quite confused, gave limited answers.      Collateral: Luis Graham () [phone]  States patient has been going wild. Broke window and mirror 4 months ago and continues to destroy his room. Believes this is due to his auditory and visual hallucinations of the devil and spirits. Patient is often seen talking to himself. Suspicious that he may be using drugs (unsure details). Has made homicidal ideation towards family and is afraid that they will kill " about treatment with the members of the multidisciplinary ICU team and documentation. This time does not include time spent in performing any separately billed procedures.    Santiago Romo MD  Neurovascular (Stroke)  Neurocritical Care      =====================    HPI Per Dr. Spain on 5/25 : \"Leatha Leal is an 70 year old female who presented to the ED via EMS from a nursing home with altered mental status. Staff noted the pt was only responsive to painful stimuli when they checked on the pt 20 minutes prior to arrival. Staff reported that they gave pt meds at 5 PM and then checked on the pt 20 minutes prior to arrival because the pt had not come down for dinner at 6:30 PM. Pt is on Tramdol and Baclofen. She has a h/o MS and there is concern for seizure activity.  Pt transferred to [facility] neuro ICU for further monitoring and management.\"    COMPREHENSIVE PHYSICAL EXAM:     VITALS: Vitals with min/max:    Vital Last Value 24 Hour Range   Temperature 97.6 °F (36.4 °C) (05/25/18 1600) Temp  Min: 96.7 °F (35.9 °C)  Max: 98.1 °F (36.7 °C)   Pulse 65 (05/25/18 1705) Pulse  Min: 60  Max: 74   Respiratory 20 (05/25/18 1705) Resp  Min: 14  Max: 29   Non-Invasive  Blood Pressure 91/54 (05/25/18 1705) BP  Min: 81/45  Max: 132/58   Pulse Oximetry 95 % (05/25/18 1705) SpO2  Min: 92 %  Max: 100 %   Arterial   Blood Pressure   No Data Recorded      GENERAL:  No apparent distress     EYES:  No ptosis, clear conjunctivae.  Pupil exam as in NEURO exam below     PSYCHIATRIC:    Level of Sensorium -- stuporous    Orientation -- x 0   NEUROLOGICAL:    Language Mod (Global) Aphasia    Dysarthria 2 (Severe )    CN -- PERRLA, Facial Droop none    Motor Strength --     Fawad UEs intermittently follows    Fawad LEs withdraws    Motor Tone -- No cogwheel ridigity     MUSCULOSKELETAL:  Motor strength & tone as in NEURO exam above       CARDIOVASCULAR:    Peripheral Pulses -- Dorsalis pedis & posterior tibialis pulses 2+  bilaterally    Auscultation -- RRR.  Normal S1 S2.  No murmur   RESPIRATORY:    Effort --  Normal    Auscultation -- Breath sounds clear and symmetric     ABDOMEN:     Palpation -- No tenderness or masses.  No organomegaly    Auscultation --  Normal bowel sounds     SKIN:      Inspection --  No masses or lesions by inspection    Palpation --   No masses or lesions by palpation    ======================    Historical Data reviewed:    ROS: Unable to obtain as pateint is obtunded    PAST MEDICAL HISTORY:  Past Medical History:   Diagnosis Date   • Depression    • Diabetes mellitus (CMS/HCC)    • Essential (primary) hypertension    • Malignant neoplasm (CMS/HCC)     uterine cancer   • MS (multiple sclerosis) (CMS/HCC)    • Neuralgia     face   • Other and unspecified hyperlipidemia    • RAD (reactive airway disease)    • Thyroid condition    • Vitamin B deficiency      Past Surgical History:   Procedure Laterality Date   • Bariatric surgery  1978   • Eye surgery  03/19/2013    rt eye   • Hysterectomy     • Rotator cuff repair      left shoulder   • Toe surgery      hammer       FAMILY HISTORY:  Family History   Problem Relation Age of Onset   • Cancer Mother         uterine cancer   • Heart disease Mother    • Hypertension Mother    • Diabetes Father    • Heart disease Father    • Hypertension Father    • Diabetes Brother    • Heart disease Brother    • Hypertension Brother    • Cancer Maternal Aunt         uterine cancer   • Diabetes Paternal Uncle    • Arthritis Maternal Grandmother    • Heart disease Maternal Grandmother    • Hypertension Maternal Grandmother    • COPD Maternal Grandfather    • Arthritis Maternal Grandfather    • Heart disease Maternal Grandfather    • Hypertension Maternal Grandfather    • Arthritis Paternal Grandmother    • Heart disease Paternal Grandmother    • Hypertension Paternal Grandmother    • Arthritis Paternal Grandfather    • Heart disease Paternal Grandfather    • Hypertension Paternal  him as he believes that they stole is money from that he made when he was employed. Is not aware of patient ever threatening group home members. Concerned that patient is vomiting medications or flushing them down the toilet, but has not seen him do this himself. Tried to make appointment with psych provider Maci at [facility] but refused to attend appointment, so she advised that they take him to the ED. States that patient will always deny all such behaviors and will behave as if everything is fine when interviewed. States that patient is a good dalton and that he is welcome back to the group home.     Per my interview with patient:  Patient does not corroborate the events leading up to admission as documented above.  Patient reports his group home, [facility], is causing chaos in his life and infringing on his freedoms.  Patient reports he recently left the  for a few days and stayed in a hotel.  Patient states he tried to find an apartment, but forgot he needs a co-signer.  It is unclear how patient would be able to pay for an apartment, as he reports no income.  Patient states he decided to return to  because he had no other place to go.  Patient denies psychiatric decompensation prior to admission.  He denies destroying property at the group home.  He does admit to doing gymnastics in his room, but attributes this to boredom.  He denies breaking mirrors or destroying group home property in the process of performing his gymnastics.  Patient denies nonadherence with psychiatric medications.  He reports taking Zyprexa prior to admission.  Patient also reports taking some other medications.  However, patient is unsure what these medications are or why he was taking them.  Patient is unable to verbalize why he takes Zyprexa.  Patient reports he does not have any psychiatric diagnoses.  He denies current or previous symptoms of psychosis, including auditory hallucinations, visual hallucinations, paranoia, or  "ideas of reference.  Patient reports he was prescribed Zyprexa for a \"made up symptom\".  Patient states he is living at the group home because he is \"homeless and needed a place to stay\".  Patient denies symptoms of depression, including low moods, suicidal ideation, hopelessness, or helplessness.  He denies symptoms of yojana, including decreased need for sleep, impulsivity, racing thoughts, or pressured speech.  Patient reports sleeping 8 hours per night.  He denies homicidal ideation or aggression towards others.  He denies nightmares or a history of trauma.  He is unsure why he is prescribed prazosin at night.  Patient denies anxiety, irritability, restlessness, OCD, or eating disorders.      See Admission note by NARAYAN Sales, CRICKET, for additional details.     Diagnoses:     Primary Diagnosis:  Schizophrenia, decompensated  Medication nonadherence      Secondary Diagnoses:   Cannabis use disorder, per chart  Tobacco use, r/o disorder  Trauma, r/o PTSD    Clinically Significant Risk Factors        # Financial/Environmental Concerns:        Labs:      Results for orders placed or performed during the hospital encounter of 07/29/24   Vitamin D Deficiency     Status: Normal   Result Value Ref Range    Vitamin D, Total (25-Hydroxy) 27 20 - 50 ng/mL    Narrative    Season, race, dietary intake, and treatment affect the concentration of 25-hydroxy-Vitamin D. Values may decrease during winter months and increase during summer months.    Vitamin D determination is routinely performed by an immunoassay specific for 25 hydroxyvitamin D3.  If an individual is on vitamin D2(ergocalciferol) supplementation, please specify 25 OH vitamin D2 and D3 level determination by LCMSMS test VITD23.     Hemoglobin A1c     Status: Normal   Result Value Ref Range    Hemoglobin A1C 5.3 <5.7 %   Lipid panel reflex to direct LDL     Status: Normal   Result Value Ref Range    Cholesterol 115 <200 mg/dL    Triglycerides 58 <150 " Grandfather    • Diabetes Brother    • Diabetes Brother        SOCIAL HISTORY:  Social History   Substance Use Topics   • Smoking status: Former Smoker   • Smokeless tobacco: Never Used      Comment: quit smoking in the 80's   • Alcohol use Yes      Comment: rare       ALLERGIES:  ALLERGIES:   Allergen Reactions   • Baclofen RASH and SEIZURES     And lower seizure thershold   • Shalom [Salvia Officinalis (Shalom)] PRURITUS and ERYTHEMA     Skin  used prior to surgery   • Sulfa Antibiotics SHORTNESS OF BREATH     \"felt like disconnected\"   • Tramadol SEIZURES   • Unknown ERYTHEMA     Bleach causes skin break-down   • Iron PRURITUS     IV iron       ======================  Data in the EMR was reviewed either in multidisciplinary rounds or separately. All images were personally reviewed. Pertinent data as follows    ----------Neuro----------     GSC    Is Patient Receiving Medication to Decrease LOC?: No (05/25/18 1800)  Symptoms of Increased ICP: None (05/25/18 1800)  Eye Opening: To sound (05/25/18 1800)  Verbal Response: No response (05/25/18 1800)  Motor Response: Obeys verbal commands (05/25/18 1800)  GCS Score: 10 (05/25/18 1800)  LOC    Level of Consciousness: Lethargic  Eye    To sound (05/25/18 1800)  Verbal    No response (05/25/18 1800)    CT:      CTA Head/Neck:      MRI:      MRA Head/Neck        ----------Cardiovascular----------    Cuff BP    91/54 BP  Min: 81/45  Max: 132/58  Art BP      No Data Recorded    Coags  Recent Labs      05/24/18   2246   PT  9.8   INR  1.0       Toponins  Invalid input(s): TROPONINI    Invalid input(s): CKTOTAL, CKMB, CKMBINDEX    Thyroid  Recent Labs      05/25/18   0908   TSH  3.589       Lipids  No results for input(s): HDL in the last 72 hours.    Invalid input(s): CHOL, LDLCALC, TRIG, CHOLHDL    ECHO:  No results found for this or any previous visit.    EKG:  Results for orders placed or performed during the hospital encounter of 05/24/18   Electrocardiogram 12-Lead    Result Value Ref Range    Ventricular Rate EKG/Min (BPM) 68     Atrial Rate (BPM) 68     OR-Interval (MSEC) 188     QRS-Interval (MSEC) 104     QT-Interval (MSEC) 448     QTc 476     P Axis (Degrees) 45     R Axis (Degrees) -23     T Axis (Degrees) 40     REPORT TEXT       Normal sinus rhythm  Cannot rule out  Anterior infarct  , age undetermined  When compared with ECG of  24-MAY-2018 22:33,  QT has lengthened         ----------Renal----------      Intake/Output Summary (Last 24 hours) at 05/25/18 1848  Last data filed at 05/25/18 1800   Gross per 24 hour   Intake              336 ml   Output             1040 ml   Net             -704 ml       Recent Labs      05/24/18 2246 05/25/18   0408  05/25/18   0908   SODIUM  137  140   --    POTASSIUM  3.3*  3.6   --    CHLORIDE  101  107   --    CO2  26  20*   --    GLUCOSE  221*  267*   --    PHOS   --    --   3.5   MG   --    --   1.7       Recent Labs      05/24/18 2246 05/25/18   0408   BUN  47*  38*   CREATININE  1.60*  1.31*       No results for input(s): LACTA in the last 72 hours.    ----------Pulmonary----------    Respiratory Rate  20      Resp  Min: 14  Max: 29  SpO2    (2 L/min)      95 %      SpO2  Min: 92 %  Max: 100 %      Recent Labs  Lab 05/25/18  0823   APH 7.32*   APCO2 39   APO2 86   AHCO3 19*   FIO2 UNKNOWN       FiO2 (%):  [30 %-40 %] 40 %    RT Assessments:  Sputum  ,     ,     Results for orders placed during the hospital encounter of 05/24/18   XR Chest AP or PA    Narrative XR CHEST AP OR PA    HISTORY: Cough     COMPARISON: None     TECHNIQUE: AP views of the chest.      Impression FINDINGS/IMPRESSION:    Multiple surgical clips epigastric and left upper quadrant abdomen. Low  lung volumes.    Cardiomediastinal silhouette and pulmonary vasculature are mildly  prominent, probably accentuated by low lung volumes. Mild bibasilar  opacities and strandy midlung opacities bilaterally, also probably  accentuated by low lung volumes. No large  mg/dL    Direct Measure HDL 41 >=40 mg/dL    LDL Cholesterol Calculated 62 <=100 mg/dL    Non HDL Cholesterol 74 <130 mg/dL    Narrative    Cholesterol  Desirable:  <200 mg/dL    Triglycerides  Normal:  Less than 150 mg/dL  Borderline High:  150-199 mg/dL  High:  200-499 mg/dL  Very High:  Greater than or equal to 500 mg/dL    Direct Measure HDL  Female:  Greater than or equal to 50 mg/dL   Male:  Greater than or equal to 40 mg/dL    LDL Cholesterol  Desirable:  <100mg/dL  Above Desirable:  100-129 mg/dL   Borderline High:  130-159 mg/dL   High:  160-189 mg/dL   Very High:  >= 190 mg/dL    Non HDL Cholesterol  Desirable:  130 mg/dL  Above Desirable:  130-159 mg/dL  Borderline High:  160-189 mg/dL  High:  190-219 mg/dL  Very High:  Greater than or equal to 220 mg/dL   Folate     Status: Normal   Result Value Ref Range    Folic Acid 10.1 4.6 - 34.8 ng/mL   Vitamin B12     Status: Normal   Result Value Ref Range    Vitamin B12 491 232 - 1,245 pg/mL   Comprehensive metabolic panel     Status: Normal   Result Value Ref Range    Sodium 141 135 - 145 mmol/L    Potassium 4.4 3.4 - 5.3 mmol/L    Carbon Dioxide (CO2) 27 22 - 29 mmol/L    Anion Gap 8 7 - 15 mmol/L    Urea Nitrogen 18.4 6.0 - 20.0 mg/dL    Creatinine 0.94 0.67 - 1.17 mg/dL    GFR Estimate >90 >60 mL/min/1.73m2    Calcium 8.8 8.8 - 10.4 mg/dL    Chloride 106 98 - 107 mmol/L    Glucose 88 70 - 99 mg/dL    Alkaline Phosphatase 92 40 - 150 U/L    AST 29 0 - 45 U/L    ALT 39 0 - 70 U/L    Protein Total 7.0 6.4 - 8.3 g/dL    Albumin 4.2 3.5 - 5.2 g/dL    Bilirubin Total 0.6 <=1.2 mg/dL   GGT     Status: Normal   Result Value Ref Range    GGT 26 8 - 61 U/L   CBC with platelets and differential     Status: None   Result Value Ref Range    WBC Count 6.8 4.0 - 11.0 10e3/uL    RBC Count 5.85 4.40 - 5.90 10e6/uL    Hemoglobin 15.8 13.3 - 17.7 g/dL    Hematocrit 48.0 40.0 - 53.0 %    MCV 82 78 - 100 fL    MCH 27.0 26.5 - 33.0 pg    MCHC 32.9 31.5 - 36.5 g/dL    RDW 13.8  10.0 - 15.0 %    Platelet Count 328 150 - 450 10e3/uL    % Neutrophils 61 %    % Lymphocytes 27 %    % Monocytes 10 %    % Eosinophils 1 %    % Basophils 1 %    % Immature Granulocytes 0 %    NRBCs per 100 WBC 0 <1 /100    Absolute Neutrophils 4.1 1.6 - 8.3 10e3/uL    Absolute Lymphocytes 1.9 0.8 - 5.3 10e3/uL    Absolute Monocytes 0.7 0.0 - 1.3 10e3/uL    Absolute Eosinophils 0.1 0.0 - 0.7 10e3/uL    Absolute Basophils 0.1 0.0 - 0.2 10e3/uL    Absolute Immature Granulocytes 0.0 <=0.4 10e3/uL    Absolute NRBCs 0.0 10e3/uL   Urine Drug Screen Panel     Status: Normal   Result Value Ref Range    Amphetamines Urine Screen Negative Screen Negative    Barbituates Urine Screen Negative Screen Negative    Benzodiazepine Urine Screen Negative Screen Negative    Cannabinoids Urine Screen Negative Screen Negative    Cocaine Urine Screen Negative Screen Negative    Fentanyl Qual Urine Screen Negative Screen Negative    Opiates Urine Screen Negative Screen Negative    PCP Urine Screen Negative Screen Negative   EKG 12-lead, tracing only     Status: None   Result Value Ref Range    Systolic Blood Pressure  mmHg    Diastolic Blood Pressure  mmHg    Ventricular Rate 59 BPM    Atrial Rate 59 BPM    IN Interval 128 ms    QRS Duration 84 ms     ms    QTc 384 ms    P Axis 50 degrees    R AXIS 91 degrees    T Axis 34 degrees    Interpretation ECG       Sinus bradycardia  Rightward axis  Borderline ECG  No previous ECGs available  Confirmed by fellow Elias Meza ([id number]) on 8/1/2024 11:24:10 AM  Confirmed by MD JAMES HENRI ([id number]) on 8/1/2024 11:29:44 PM     CBC with Platelets & Differential     Status: None    Narrative    The following orders were created for panel order CBC with Platelets & Differential.  Procedure                               Abnormality         Status                     ---------                               -----------         ------                     CBC with platelets and d...[[id number]]            effusions. No pneumothorax.      I have reviewed the images and agree with the Resident's interpretation.          ----------GI----------    LFTs  Recent Labs      18   AST  42*       Invalid input(s): A1C    ----------Infectious ----------    Temp  Av.5 °F (36.4 °C)  Min: 96.7 °F (35.9 °C)  Max: 98.1 °F (36.7 °C)     Recent Labs      18   WBC  8.4   HGB  9.2*   HCT  28.8*   MCV  89.4   PLT  191         Recent Labs  Lab 18   AST 42*   GPT 26         Recent Labs  Lab 18  0908 18  0408 18   CREATININE  --  1.31* 1.60*   PCT 0.15*  --   --          Recent Labs  Lab 18  2335   USPG >1.030*   UPH 5.0   UROB 1.0   UWBC MODERATE*   UBACTR LARGE*   UNITR NEGATIVE   LEUK >100*   URBC LARGE*        Microbiology:      Specimen Description (no units)   Date Value   2018 BLOOD R HAND   2018 BLOOD R AC       CULTURE (no units)   Date Value   2018 NO GROWTH <24 HRS.   2018 NO GROWTH <24 HRS.       ----------Medication Reivew----------  CURRENT MEDICATIONS    INFUSIONS  • phenylephrine (ERICK-SYNEPHRINE) 50 mg in sodium chloride 0.9 % 250 mL infusion     • sodium chloride 0.9% infusion Stopped (18 1309)   • sodium chloride 0.9% infusion 50 mL/hr at 18 1309   • insulin regular (human) (HumuLIN R) 100 units in sodium chloride 0.9% 100 mL infusion 12 Units/hr (18 1804)   • dextrose 5 % infusion     • ketamine (KETALAR) 500 mg in sodium chloride 0.9 % 250 mL standard concentration infusion 0.5 mg/kg/hr (18 0874)       SCHEDULED  • aspirin  81 mg Oral Daily   • calcium acetate gelcap  1,334 mg Oral Daily   • cholecalciferol  4,000 Units Oral Daily   • [START ON 2018] ergocalciferol  50,000 Units Oral Once per day on Mon   • latanoprost  1 drop Both Eyes Nightly   • montelukast  10 mg Oral Nightly   • piperacillin-tazobactam (ZOSYN) 30 minute duration IVPB  3.375 g Intravenous 3 times per day   • docusate calcium              Final result                 Please view results for these tests on the individual orders.   Urine Drug Screen     Status: Normal    Narrative    The following orders were created for panel order Urine Drug Screen.  Procedure                               Abnormality         Status                     ---------                               -----------         ------                     Urine Drug Screen Panel[[id number]]      Normal              Final result                 Please view results for these tests on the individual orders.           Consults:     Per Pharmacy Liaison Consult Note on 8/8/2024:   Consulted to run a test claim for Olanzapine ODT.     Patient has pharmacy benefits through [employer]. Per insurance, this medication is not covered and requires prior authorization.     This process has been started--please see separate notes linked from Prior Authorization Encounter for details/updates regarding this PA.     Please feel free to contact me with any other test claims, prior authorizations, or insurance questions regarding outpatient medications.     Thanks!  Hilda Forbes [facility]  Discharge Pharmacy Liaison  [facility]  Pronouns: She/Her/Hers     Securely message with PostRocket, Epic Secure Chat, or Accumulate  Phone: [phone]  Fax: [phone]  [email or web address]         Hospital Course:     Jw Broderick was admitted on a 72-hour mental health hold to [address] with attending Jamie Rob MD, under the direct care of NARAYAN Sales CNP.  The patient was placed under status 15 (15 minute checks), suicide precautions, cheeking precautions, elopement precautions, sexual precautions, and assault precautions to ensure patient safety.  UDS, CBC, CMP, TSH, lipids, HgbA1c, B12/folate, vitamin D, and EKG were obtained.  Patient did not require seclusion or administration of emergency  240 mg Oral BID   • heparin (porcine)  5,000 Units Subcutaneous 3 times per day   • lacosamide (VIMPAT) in sodium chloride 0.9% 100 mL IVPB  200 mg Intravenous 2 times per day   • clopidogrel  75 mg Oral Daily   • pregabalin  150 mg Oral BID   • mirtazapine  15 mg Oral Nightly   • levothyroxine  224 mcg Oral Daily   • insulin glargine  5 Units Subcutaneous Nightly   • folic acid  800 mcg Oral Daily   • docusate sodium-sennosides  2 tablet Oral Nightly   • albuterol-ipratropium 2.5 mg/0.5 mg  3 mL Nebulization Q6H Resp   • thiamine (VITAMIN B1) IVPB >/= 100 mg  500 mg Intravenous Daily   • insulin lispro  4 Units Subcutaneous TID PC   • carbamazepine  400 mg Oral 2 times per day   • [START ON 5/26/2018] famotidine  20 mg Intravenous Daily   • sodium chloride (PF)  2 mL Injection 2 times per day       PRN  ondansetron, acetaminophen **OR** acetaminophen **OR** acetaminophen, docusate sodium-sennosides, magnesium hydroxide, sodium chloride, nitroGLYcerin, LORazepam, bisacodyl, polyethylene glycol, sodium biphosphate, hydrALAZINE, labetalol, dextrose, dextrose, glucagon, dextrose, sodium chloride (PF), lidocaine    Prior to Admission Meds  Prescriptions Prior to Admission   Medication Sig Dispense Refill   • amLODIPine (NORVASC) 10 MG tablet Take 10 mg by mouth daily.     • atorvastatin (LIPITOR) 40 MG tablet Take 40 mg by mouth daily.     • cyanocobalamin 1000 MCG/ML injection Inject 1,000 mcg into the muscle every 14 days.     • estradiol (ESTRACE) 0.1 MG/GM vaginal cream Place 2 g vaginally once a week. Wednesday     • folic acid (FOLATE) 400 MCG tablet Take 800 mcg by mouth daily. Dose 2 tablets (= 800 mcg)     • hydrochlorothiazide (HYDRODIURIL) 12.5 MG tablet Take 12.5 mg by mouth daily.     • Insulin Glargine (LANTUS SC) Inject 14 Units into the skin nightly.     • levothyroxine (SYNTHROID, LEVOTHROID) 112 MCG tablet Take 224 mcg by mouth daily. Dose 2 tablets (=224 mg)     • lisinopril (ZESTRIL) 10 MG tablet  Take 10 mg by mouth daily.     • mirabegron ER (MYRBETRIQ) 50 MG 24 hr tablet Take 50 mg by mouth daily.     • Multiple Vitamins-Minerals (VITAMIN - THERAPEUTIC MULTIVITAMINS W/MINERALS) Tab Take 1 tablet by mouth daily.     • clopidogrel (PLAVIX) 75 MG tablet Take 75 mg by mouth daily.     • ciprofloxacin (CIPRO) 250 MG tablet Take 250 mg by mouth 2 times daily.     • fluticasone-salmeterol (ADVAIR HFA) 115-21 MCG/ACT inhaler Inhale 2 puffs into the lungs 2 times daily.     • nystatin (MYCOSTATIN) 890935 UNIT/GM powder Apply 1 application topically 2 times daily.     • pregabalin (LYRICA) 150 MG capsule Take 150 mg by mouth 2 times daily.     • calcitRIOL (ROCALTROL) 0.25 MCG capsule Take 0.25 mcg by mouth 2 times daily.     • insulin lispro (HUMALOG) 100 UNIT/ML correction dose Inject 15 Units into the skin 3 times daily (before meals). If BS 0-149 = inject 15 units, 150-200 = inject 17 units, 201-250 = inject 18 units, 251-300 = inject 19 units, 301-350 = 20 units, 351 + = 21 units     • miSOPROStol (CYTOTEC) 100 MCG tablet Take 100 mcg by mouth 4 times daily.     • bisacodyl (DULCOLAX) 10 MG suppository Place 10 mg rectally daily as needed for Constipation.     • clotrimazole (LOTRIMIN) 1 % cream Apply 1 application topically 2 times daily as needed.     • glucagon (GLUCAGEN) 1 MG Recon Soln Inject 1 mg into the muscle once.     • dextrose (GLUTOSE) 40 % Gel Take 1 g by mouth as needed.     • magnesium hydroxide (MILK OF MAGNESIA) 400 MG/5ML suspension Take 30 mLs by mouth daily as needed for Constipation.     • albuterol 108 (90 Base) MCG/ACT inhaler Inhale 2 puffs into the lungs every 4 hours as needed for Shortness of Breath or Wheezing.     • acetaminophen (TYLENOL) 500 MG tablet Take 1,000 mg by mouth every 6 hours as needed for Pain. Dose 2 tablets (=1,000 mg)     • carbamazepine (TEGRETOL XR) 400 MG 12 hr tablet Take 400 mg by mouth 2 times daily.     • escitalopram (LEXAPRO) 10 MG tablet Take 10 mg by  medications to manage behavior.  Jw Broderick did participate in groups and was visible in the milieu.     Jw Broderick is a 24 year old male previously diagnosed with schizophrenia and cannabis use disorder who presented on a 72-hour hold from [facility] to [facility] [address] [place] [address] on 7/29/2024 with psychosis in the context of suspected medication nonadherence and possible substance use.  UDS was not collected in the ED, but was ordered on 7/30/2024.  Most recent psychiatric hospitalization was at [facility] from 4/19/2021 - 7/14/2021 after being released from [facility] under a Mental Illness Commitment and a Substitute Decision Maker.  Significant symptoms on admission included psychosis and poor insight and judgement.  The MSE on admission was pertinent for guarded attitude, apathetic and blunted affect, disorganized and illogical thought process, internal preoccupation, distractibility, impaired attention, and poor insight and judgement.  Symptoms and presentation at time of admission were most consistent with decompensated schizophrenia, likely secondary to medication nonadherence and possibly complicated by substance use.  I discussed the risks, benefits, and alternatives of Zyprexa, which patient was taking prior to admission.  Patient agreed to continue this medication.  Patient will likely benefit from increased mental health supports upon discharge.  Inpatient psychiatric hospitalization was warranted for safety, stabilization, and possible adjustment in medications.      During inpatient hospitalization, patient was placed on a back-to-back 72-hour hold.  [facility] completed and faxed pre-petition paperwork late on the afternoon of 7/29/2024, but they failed to call [facility] to initiate the petition for commitment.  The patient arrived on [facility] [address] [place] [address] during the evening shift on 7/29.  [facility] informed us they would be unable to complete the pre-petition  mouth daily.      • buPROPion (WELLBUTRIN SR) 150 MG 12 hr tablet Take 150 mg by mouth daily. Indications: Major Depressive Disorder     • mirtazapine (REMERON) 15 MG tablet Take 15 mg by mouth nightly.      • aspirin 81 MG tablet Take 81 mg by mouth daily.     • calcium acetate gelcap (PHOSLO) 667 MG CAPS Take 1,334 mg by mouth 3 times daily (with meals).      • montelukast (SINGULAIR) 10 MG tablet Take 10 mg by mouth daily.      • ergocalciferol (DRISDOL) 59010 UNITS capsule Take 50,000 Units by mouth once a week. Takes on Wednesday     • cholecalciferol (VITAMIN D3) 1000 UNITS tablet Take 2,000 Units by mouth daily. Dose 2 tablets (=2,000 mg)     • fish oil-omega-3 fatty acids 1000 MG CAPS Take 1 capsule by mouth daily.     • latanoprost (XALATAN) 0.005 % ophthalmic solution Place 1 drop into both eyes nightly. Indications: Wide-Angle Glaucoma          process prior to expiration of the original 72-hour hold.  Given patient's psychiatric decompensation, he remained a risk of harm to himself and others and would be unable to care for himself independently in the community. Thus, a second 72-hour hold was initiated on 7/30/2024 at 1016.       Upon admission to Station [address] [place] [address], patient's PTA Zyprexa, Lexapro, and prazosin were continued.  On admission, PRN hydroxyzine, PRN Zyprexa, and PRN trazodone were initiated.  Petition for commitment and [place] were initiated while patient was in the ED.  Court hold effective 8/1 @ 1618.  On 8/6/2024, patient agreed to a stay of commitment.  During inpatient hospitalization, patient's blood pressures were consistently elevated above 130/80.  Patient was not asymptomatic.  PRN clonidine was ordered BID for BPs >160/100.  Internal medicine did not believe acute intervention was necessary for blood pressures, as diastolic pressures remained below 100.  IM recommended patient follow-up with PCP after discharge to assess need for an antihypertensive.  UDS was negative on admission.  CMP, CBC, TSH, lipids, vitamin B12, and vitamin D were all within normal limits.     During inpatient hospitalization, the patient's symptoms of psychosis improved.  He denied suicidal ideations, plans, or intent throughout the course of inpatient hospitalization.  On day of discharge, patient denied anxiety, depression, hallucinations, or delusions.  No overt psychosis or paranoia evident on exam.  On day of discharge, thoughts were organized and goal directed. During inpatient hospitalization, sleep and appetite improved.  On day of discharge, patient was future-oriented and verbalized goals to find employment so that he can save for a house.  Patient agrees to follow up with outpatient psychiatry for outpatient medication management and further diagnostic clarification.  At time of discharge, patient denied suicidal ideations, plans, or intent, or  homicidal ideations, plans or intent.      Jw Broderick was released to [facility].  At the time of discharge Jw Broderick was determined to not be a danger to himself or others.  At the time of discharge, the patient did not meet criteria for involuntary hospitalization.  On the day of discharge, the patient reported that they did not have suicidal or homicidal ideation and would never hurt themselves or others.  Steps taken to minimize risk included: assessing patient s behavior and thought process daily during hospital stay, discharging patient with adequate plan for follow up for mental and physical health and discussing safety plan of returning to the hospital should the patient ever have thoughts of harming themselves or others.  Therefore, based on all available evidence including the factors cited above, the patient did not appear to be at imminent risk for self-harm, and was appropriate for outpatient level of care.             Discharge Medications:      Details   !! OLANZapine zydis (ZYPREXA) 15 MG ODT Take 1 tablet (15 mg) by mouth at bedtime  Qty: 30 tablet, Refills: 0    Associated Diagnoses: Undifferentiated schizophrenia (H); Nonadherence to medication      !! OLANZapine zydis (ZYPREXA) 5 MG ODT Take 1 tablet (5 mg) by mouth daily  Qty: 30 tablet, Refills: 0    Associated Diagnoses: Undifferentiated schizophrenia (H); Nonadherence to medication       !! - Potential duplicate medications found. Please discuss with provider.      Details   escitalopram (LEXAPRO) 10 MG tablet Take 1 tablet (10 mg) by mouth daily  Qty: 30 tablet, Refills: 0    Associated Diagnoses: Depression, unspecified depression type      prazosin (MINIPRESS) 1 MG capsule Take 1 capsule (1 mg) by mouth at bedtime  Qty: 30 capsule, Refills: 0    Associated Diagnoses: PTSD (post-traumatic stress disorder)                Psychiatric Examination:     Appearance:  awake, alert, adequately groomed, dressed in  hospital scrubs, and appeared as age stated  Attitude:  cooperative  Eye Contact:  good  Mood:  good  Affect:  appropriate and in normal range and mood congruent  Speech:  clear, coherent  Psychomotor Behavior:  no evidence of tardive dyskinesia, dystonia, or tics and intact station, gait and muscle tone  Thought Process:  linear and goal oriented  Associations:  no loose associations  Thought Content:  no evidence of suicidal ideation or homicidal ideation, no auditory hallucinations present, and no visual hallucinations present  Insight:  fair  Judgment:  intact  Oriented to:  time, person, and place  Attention Span and Concentration:  intact  Recent and Remote Memory:  intact  Language:  fluent in English  Fund of Knowledge: appropriate  Muscle Strength and Tone: normal  Gait and Station: Normal         Discharge Plan:     Continue medications as above.     Per AVS  Summary: You were admitted on 7/29/2024 due to agitation and disruptive behavior.  You were treated by NARAYAN Sales CNP for ongoing psychiatric assessment and medication management.  You had opportunities to participate in therapeutic groups on the unit. You were discharged on 8/12/2024 from [facility] [address] to [facility] located at [address] [place] [address].     Commitment:  On 8/6/2024, you were committed as a person who poses a risk of harm due to a mental illness to the Commissioner of Human Services and to the head of [facility]. This commitment was stayed on conditions outlined in your order. If there is psychiatric decompensation, an integral condition of the stay had been substantially violated, or your treatment needs are not being met, the stay may be vacated and the commitment executed. The stay and further court proceedings will terminate on 2/6/2025 unless the stay has been revoked before that date or is continued by Order of the Court.       You will be assigned a [facility]  to monitor your Stay of Commitment and report your progress to the courts. Until you are assigned a , your case management contact is:     YAW Oden  [facility]  Phone [phone] Fax [phone]   [email or web address]     Primary Diagnosis:  Schizophrenia, decompensated     Secondary Diagnoses:   Cannabis use disorder, per chart  Tobacco use, r/o disorder  Trauma, r/o PTSD     Health Care Follow-up:   Psychiatric Medication Management Tuesday August 13th at 3:30 PM in person  NARAYAN Mueller, CNP, PMHNP-BC with [facility] [address], [address] [place] [address]  Phone [phone] Fax [phone]     Primary Care Provider:- for high blood pressure Wednesday August 14th at 11AM in person   Provider: Dr. Mayers  Address: [address] [place] [id number]   Phone: [phone]  Fax: [phone]     Attend all scheduled appointments with your outpatient providers. Call at least 24 hours in advance if you need to reschedule an appointment to ensure continued access to your outpatient providers.      Major Treatments, Procedures and Findings:  You were provided with: a psychiatric assessment, assessed for medical stability, medication evaluation and/or management, group therapy, individual therapy, CD evaluation/assessment, and milieu management     Symptoms to Report: feeling more aggressive, increased confusion, losing more sleep, mood getting worse, or thoughts of suicide     Early warning signs can include: increased depression or anxiety sleep disturbances increased thoughts or behaviors of suicide or self-harm  increased unusual thinking, such as paranoia or hearing voices     Safety and Wellness:  Take all medicines as directed.  Make no changes unless your doctor suggests them.      Follow treatment recommendations.  Refrain from alcohol and non-prescribed drugs.  If  there is a concern for safety, call 911.     Resources:   Mental Health Crisis Resources  Throughout [place]: call **CRISIS (**638963)  Crisis Text Line: is available for free, 24/7 by texting [place] to 654167  Suicide Awareness Voices of Education (SAVE) (www.save.org): 569-129-RZPN (4736)  The National Suicide Prevention Lifeline is now: 988 Suicide and Crisis Lifeline. Call 988 anytime.  National [place] on Mental Illness (www.[place].josemanuel.org): 814.950.5484 or 975-704-4629.  Znjj4mtcb: text the word LIFE to 95687 for immediate support and crisis intervention  Mental Health Consumer/Survivor Network of [place] (www.mhcsn.net): 461.886.6941 or 810-848-0835  Mental Health Association of [place] (www.mentalhealth.org): 650.285.8321 or 627-257-3361  Peer Support Connection [place] Warmline (PSC) 1-313.681.4656 Available from 5pm - 9am (7 days a week/365 days a year)  [facility] 2-228-080-7212     General Medication Instructions:   See your medication sheet(s) for instructions.   Take all medicines as directed.  Make no changes unless your doctor suggests them.   Go to all your doctor visits.  Be sure to have all your required lab tests. This way, your medicines can be refilled on time.  Do not use any drugs not prescribed by your doctor.  Avoid alcohol.     Advance Directives:   Scanned document on file with [employer]? No scanned doc  Is document scanned? Pt states no documents  Honoring Choices Your Rights Handout: Informed and given  Was more information offered? Pt declined     The Treatment team has appreciated the opportunity to work with you. If you have any questions or concerns about your recent admission, you can contact the unit which can receive your call 24 hours a day, 7 days a week. They will be able to get in touch with a Provider if needed. The unit number is [phone].      Certain aspects of this note may be copied or templated. Content is updated and changed where needed to reflect the most recent assessment and  plan of care. This document is created with the help of Dragon dictation system.  All grammatical/typing errors or context distortion are unintentional and inherent to software.     Attestation:  The patient was seen and evaluated by me. I spent >35 min on the date of the encounter in chart review, patient visit, review of tests, documentation, care coordination, and/or discussion with other providers about the issues documented above.   Johanna Zarate, APRN, CNP